# Patient Record
Sex: FEMALE | Race: WHITE | NOT HISPANIC OR LATINO | URBAN - METROPOLITAN AREA
[De-identification: names, ages, dates, MRNs, and addresses within clinical notes are randomized per-mention and may not be internally consistent; named-entity substitution may affect disease eponyms.]

---

## 2018-07-23 ENCOUNTER — INPATIENT (INPATIENT)
Facility: HOSPITAL | Age: 83
LOS: 2 days | Discharge: HOME | DRG: 100 | End: 2018-07-26
Attending: INTERNAL MEDICINE | Admitting: INTERNAL MEDICINE
Payer: MEDICARE

## 2018-07-23 VITALS
WEIGHT: 104.94 LBS | TEMPERATURE: 98 F | OXYGEN SATURATION: 99 % | RESPIRATION RATE: 16 BRPM | DIASTOLIC BLOOD PRESSURE: 66 MMHG | SYSTOLIC BLOOD PRESSURE: 120 MMHG | HEART RATE: 72 BPM | HEIGHT: 61 IN

## 2018-07-23 DIAGNOSIS — G40.909 EPILEPSY, UNSPECIFIED, NOT INTRACTABLE, WITHOUT STATUS EPILEPTICUS: ICD-10-CM

## 2018-07-23 DIAGNOSIS — K21.9 GASTRO-ESOPHAGEAL REFLUX DISEASE WITHOUT ESOPHAGITIS: ICD-10-CM

## 2018-07-23 DIAGNOSIS — D32.0 BENIGN NEOPLASM OF CEREBRAL MENINGES: ICD-10-CM

## 2018-07-23 DIAGNOSIS — G20 PARKINSON'S DISEASE: ICD-10-CM

## 2018-07-23 DIAGNOSIS — Z29.9 ENCOUNTER FOR PROPHYLACTIC MEASURES, UNSPECIFIED: ICD-10-CM

## 2018-07-23 DIAGNOSIS — I49.9 CARDIAC ARRHYTHMIA, UNSPECIFIED: ICD-10-CM

## 2018-07-23 DIAGNOSIS — Z90.710 ACQUIRED ABSENCE OF BOTH CERVIX AND UTERUS: Chronic | ICD-10-CM

## 2018-07-23 DIAGNOSIS — R41.82 ALTERED MENTAL STATUS, UNSPECIFIED: ICD-10-CM

## 2018-07-23 DIAGNOSIS — J18.9 PNEUMONIA, UNSPECIFIED ORGANISM: ICD-10-CM

## 2018-07-23 LAB
ALBUMIN SERPL ELPH-MCNC: 3.1 G/DL — LOW (ref 3.3–5)
ALP SERPL-CCNC: 132 U/L — HIGH (ref 30–120)
ALT FLD-CCNC: <10 U/L DA — LOW (ref 10–60)
ANION GAP SERPL CALC-SCNC: 8 MMOL/L — SIGNIFICANT CHANGE UP (ref 5–17)
APTT BLD: 29.6 SEC — SIGNIFICANT CHANGE UP (ref 27.5–37.4)
AST SERPL-CCNC: 14 U/L — SIGNIFICANT CHANGE UP (ref 10–40)
BASOPHILS # BLD AUTO: 0.04 K/UL — SIGNIFICANT CHANGE UP (ref 0–0.2)
BASOPHILS NFR BLD AUTO: 0.3 % — SIGNIFICANT CHANGE UP (ref 0–2)
BILIRUB SERPL-MCNC: 0.3 MG/DL — SIGNIFICANT CHANGE UP (ref 0.2–1.2)
BUN SERPL-MCNC: 22 MG/DL — SIGNIFICANT CHANGE UP (ref 7–23)
CALCIUM SERPL-MCNC: 9.1 MG/DL — SIGNIFICANT CHANGE UP (ref 8.4–10.5)
CHLORIDE SERPL-SCNC: 104 MMOL/L — SIGNIFICANT CHANGE UP (ref 96–108)
CK MB BLD-MCNC: 1 % — SIGNIFICANT CHANGE UP (ref 0–3.5)
CK MB CFR SERPL CALC: 0.3 NG/ML — SIGNIFICANT CHANGE UP (ref 0–3.6)
CK SERPL-CCNC: 29 U/L — SIGNIFICANT CHANGE UP (ref 26–192)
CO2 SERPL-SCNC: 25 MMOL/L — SIGNIFICANT CHANGE UP (ref 22–31)
CREAT SERPL-MCNC: 0.97 MG/DL — SIGNIFICANT CHANGE UP (ref 0.5–1.3)
EOSINOPHIL # BLD AUTO: 0.16 K/UL — SIGNIFICANT CHANGE UP (ref 0–0.5)
EOSINOPHIL NFR BLD AUTO: 1.2 % — SIGNIFICANT CHANGE UP (ref 0–6)
GLUCOSE SERPL-MCNC: 115 MG/DL — HIGH (ref 70–99)
HCT VFR BLD CALC: 32.3 % — LOW (ref 39–50)
HGB BLD-MCNC: 10 G/DL — LOW (ref 13–17)
IMM GRANULOCYTES NFR BLD AUTO: 0.8 % — SIGNIFICANT CHANGE UP (ref 0–1.5)
INR BLD: 1.49 RATIO — HIGH (ref 0.88–1.16)
LACTATE SERPL-SCNC: 1.6 MMOL/L — SIGNIFICANT CHANGE UP (ref 0.7–2)
LYMPHOCYTES # BLD AUTO: 1.33 K/UL — SIGNIFICANT CHANGE UP (ref 1–3.3)
LYMPHOCYTES # BLD AUTO: 10.2 % — LOW (ref 13–44)
MCHC RBC-ENTMCNC: 26 PG — LOW (ref 27–34)
MCHC RBC-ENTMCNC: 31 GM/DL — LOW (ref 32–36)
MCV RBC AUTO: 84.1 FL — SIGNIFICANT CHANGE UP (ref 80–100)
MONOCYTES # BLD AUTO: 1.14 K/UL — HIGH (ref 0–0.9)
MONOCYTES NFR BLD AUTO: 8.8 % — SIGNIFICANT CHANGE UP (ref 2–14)
NEUTROPHILS # BLD AUTO: 10.22 K/UL — HIGH (ref 1.8–7.4)
NEUTROPHILS NFR BLD AUTO: 78.7 % — HIGH (ref 43–77)
NRBC # BLD: 0 /100 WBCS — SIGNIFICANT CHANGE UP (ref 0–0)
NT-PROBNP SERPL-SCNC: 514 PG/ML — HIGH (ref 0–450)
PLATELET # BLD AUTO: 356 K/UL — SIGNIFICANT CHANGE UP (ref 150–400)
POTASSIUM SERPL-MCNC: 4 MMOL/L — SIGNIFICANT CHANGE UP (ref 3.5–5.3)
POTASSIUM SERPL-SCNC: 4 MMOL/L — SIGNIFICANT CHANGE UP (ref 3.5–5.3)
PROT SERPL-MCNC: 6.5 G/DL — SIGNIFICANT CHANGE UP (ref 6–8.3)
PROTHROM AB SERPL-ACNC: 16.4 SEC — HIGH (ref 9.8–12.7)
RBC # BLD: 3.84 M/UL — LOW (ref 4.2–5.8)
RBC # FLD: 15.6 % — HIGH (ref 10.3–14.5)
SODIUM SERPL-SCNC: 137 MMOL/L — SIGNIFICANT CHANGE UP (ref 135–145)
TROPONIN I SERPL-MCNC: 0 NG/ML — LOW (ref 0.02–0.06)
WBC # BLD: 13 K/UL — HIGH (ref 3.8–10.5)
WBC # FLD AUTO: 13 K/UL — HIGH (ref 3.8–10.5)

## 2018-07-23 PROCEDURE — 70450 CT HEAD/BRAIN W/O DYE: CPT | Mod: 26

## 2018-07-23 PROCEDURE — 93010 ELECTROCARDIOGRAM REPORT: CPT

## 2018-07-23 PROCEDURE — 99285 EMERGENCY DEPT VISIT HI MDM: CPT

## 2018-07-23 PROCEDURE — 71045 X-RAY EXAM CHEST 1 VIEW: CPT | Mod: 26

## 2018-07-23 RX ORDER — LACOSAMIDE 50 MG/1
50 TABLET ORAL
Qty: 0 | Refills: 0 | Status: DISCONTINUED | OUTPATIENT
Start: 2018-07-23 | End: 2018-07-23

## 2018-07-23 RX ORDER — LOSARTAN POTASSIUM 100 MG/1
50 TABLET, FILM COATED ORAL DAILY
Qty: 0 | Refills: 0 | Status: DISCONTINUED | OUTPATIENT
Start: 2018-07-23 | End: 2018-07-26

## 2018-07-23 RX ORDER — SODIUM CHLORIDE 9 MG/ML
1000 INJECTION INTRAMUSCULAR; INTRAVENOUS; SUBCUTANEOUS ONCE
Qty: 0 | Refills: 0 | Status: COMPLETED | OUTPATIENT
Start: 2018-07-23 | End: 2018-07-23

## 2018-07-23 RX ORDER — VALSARTAN 80 MG/1
80 TABLET ORAL DAILY
Qty: 0 | Refills: 0 | Status: DISCONTINUED | OUTPATIENT
Start: 2018-07-23 | End: 2018-07-23

## 2018-07-23 RX ORDER — PIPERACILLIN AND TAZOBACTAM 4; .5 G/20ML; G/20ML
3.38 INJECTION, POWDER, LYOPHILIZED, FOR SOLUTION INTRAVENOUS ONCE
Qty: 0 | Refills: 0 | Status: COMPLETED | OUTPATIENT
Start: 2018-07-23 | End: 2018-07-23

## 2018-07-23 RX ORDER — CARBIDOPA AND LEVODOPA 25; 100 MG/1; MG/1
1 TABLET ORAL THREE TIMES A DAY
Qty: 0 | Refills: 0 | Status: DISCONTINUED | OUTPATIENT
Start: 2018-07-23 | End: 2018-07-26

## 2018-07-23 RX ORDER — METOPROLOL TARTRATE 50 MG
50 TABLET ORAL
Qty: 0 | Refills: 0 | Status: DISCONTINUED | OUTPATIENT
Start: 2018-07-23 | End: 2018-07-26

## 2018-07-23 RX ORDER — SODIUM CHLORIDE 9 MG/ML
3 INJECTION INTRAMUSCULAR; INTRAVENOUS; SUBCUTANEOUS ONCE
Qty: 0 | Refills: 0 | Status: COMPLETED | OUTPATIENT
Start: 2018-07-23 | End: 2018-07-23

## 2018-07-23 RX ORDER — APIXABAN 2.5 MG/1
2.5 TABLET, FILM COATED ORAL EVERY 12 HOURS
Qty: 0 | Refills: 0 | Status: DISCONTINUED | OUTPATIENT
Start: 2018-07-23 | End: 2018-07-26

## 2018-07-23 RX ORDER — APIXABAN 2.5 MG/1
1 TABLET, FILM COATED ORAL
Qty: 0 | Refills: 0 | COMMUNITY

## 2018-07-23 RX ORDER — LACOSAMIDE 50 MG/1
100 TABLET ORAL
Qty: 0 | Refills: 0 | Status: DISCONTINUED | OUTPATIENT
Start: 2018-07-23 | End: 2018-07-26

## 2018-07-23 RX ORDER — OMEPRAZOLE 10 MG/1
1 CAPSULE, DELAYED RELEASE ORAL
Qty: 0 | Refills: 0 | COMMUNITY

## 2018-07-23 RX ORDER — METOPROLOL TARTRATE 50 MG
1 TABLET ORAL
Qty: 0 | Refills: 0 | COMMUNITY

## 2018-07-23 RX ORDER — CARBIDOPA AND LEVODOPA 25; 100 MG/1; MG/1
1 TABLET ORAL
Qty: 0 | Refills: 0 | COMMUNITY

## 2018-07-23 RX ORDER — PANTOPRAZOLE SODIUM 20 MG/1
40 TABLET, DELAYED RELEASE ORAL
Qty: 0 | Refills: 0 | Status: DISCONTINUED | OUTPATIENT
Start: 2018-07-23 | End: 2018-07-26

## 2018-07-23 RX ORDER — VANCOMYCIN HCL 1 G
1000 VIAL (EA) INTRAVENOUS ONCE
Qty: 0 | Refills: 0 | Status: COMPLETED | OUTPATIENT
Start: 2018-07-23 | End: 2018-07-23

## 2018-07-23 RX ORDER — PIPERACILLIN AND TAZOBACTAM 4; .5 G/20ML; G/20ML
3.38 INJECTION, POWDER, LYOPHILIZED, FOR SOLUTION INTRAVENOUS EVERY 8 HOURS
Qty: 0 | Refills: 0 | Status: COMPLETED | OUTPATIENT
Start: 2018-07-24 | End: 2018-07-25

## 2018-07-23 RX ADMIN — SODIUM CHLORIDE 250 MILLILITER(S): 9 INJECTION INTRAMUSCULAR; INTRAVENOUS; SUBCUTANEOUS at 16:20

## 2018-07-23 RX ADMIN — CARBIDOPA AND LEVODOPA 1 TABLET(S): 25; 100 TABLET ORAL at 22:08

## 2018-07-23 RX ADMIN — Medication 50 MILLIGRAM(S): at 18:34

## 2018-07-23 RX ADMIN — SODIUM CHLORIDE 3 MILLILITER(S): 9 INJECTION INTRAMUSCULAR; INTRAVENOUS; SUBCUTANEOUS at 15:25

## 2018-07-23 RX ADMIN — PIPERACILLIN AND TAZOBACTAM 200 GRAM(S): 4; .5 INJECTION, POWDER, LYOPHILIZED, FOR SOLUTION INTRAVENOUS at 18:27

## 2018-07-23 RX ADMIN — APIXABAN 2.5 MILLIGRAM(S): 2.5 TABLET, FILM COATED ORAL at 18:34

## 2018-07-23 RX ADMIN — Medication 250 MILLIGRAM(S): at 19:38

## 2018-07-23 RX ADMIN — LACOSAMIDE 50 MILLIGRAM(S): 50 TABLET ORAL at 18:34

## 2018-07-23 NOTE — H&P ADULT - ASSESSMENT
82 y/o F pt with hx of HTN, seizure disorder treated with Vimpat (previously on Keppra), Cerebral Meningioma, and Parkinson's Disease brought in by ambulance presents to the ED c/o an episode of seizure today as per EMS. Pt was in the car with her , who was driving, when she had an episode of seizure and they pulled over. Ambulance was called and the EMS found her to be postictal (tired and weak). Pt is now improved. As per  and aide, pt had facial grimacing during the seizure lasting for a few minutes. Pt was recently discharged from [facility] on 7/7/18 for an MRI, before which she was seen at [facility] for dehydration and sent to [place]. As per  pt has been taking her medication regularly, but has not been drinking "enough water". Denies fever, chills, nausea, vomiting, head injury, chest pain, SOB, memory of the episode, numbness in extremities. No other complaints at this time. Patient been having loose stool.  as per  patient goes for iv hydration to [facility] and [place] twice a week.  no fever, no sob, pt was being treated at [facility] for UTi.   In ed patient is lethargic confused likly post ictal, , has leucocytosis, x ray chest showes b/l old rib fractures and paracardiac infilterate. and is being admitted for being post ictal with possible aspiration PNA. 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 Pt was in the car with her , who was driving, when she had an episode of seizure and they pulled over. Ambulance was called and the EMS found her to be postictal (tired and weak). Pt is now improved. As per  and aide, pt had facial grimacing during the seizure lasting for a few minutes. Pt was recently discharged from [facility] on 7/7/18 for an MRI, before which she was seen at [facility] for dehydration and sent to [place]. As per  pt has been taking her medication regularly, but has not been drinking "enough water". Denies fever, chills, nausea, vomiting, head injury, chest pain, SOB, memory of the episode, numbness in extremities. No other complaints at this time. Patient been having loose stool.  as per  patient goes for iv hydration to [facility] and [place] twice a week.  no fever, no sob, pt was being treated at [facility] for UTi.   In ed patient is lethargic confused likly post ictal, , has leucocytosis, x ray chest showes b/l old rib fractures and paracardiac infilterate. and is being admitted for being post ictal with possible aspiration PNA. with setting of recurrent seizures, AMS likely due to post ictal state after seizure  had  a clot in one leg 1 yr back and is since then on eliquis.    d/w daughter Riri [phone].

## 2018-07-23 NOTE — ED ADULT NURSE NOTE - OBJECTIVE STATEMENT
Patient brought to ER via ambulance from car on road where patient had twitching in face lasting for 10 minutes as witnessed by .  Patient has history of seizures and was recently at [facility] for MRI on 7/7/18.  Patient is post-ictal on arrival.  Patient awake but having trouble remembering date and day of week and month.  Patient incontinent of stool and urine.  IV med lock #18 LAC by EMS.  Patient has scabs on both lower legs from taking prednisone.  Areas are dry with large scabs noted.

## 2018-07-23 NOTE — CONSULT NOTE ADULT - PROBLEM SELECTOR RECOMMENDATION 4
poss LRTI  poss asp pna  on broad spectrum abx  HOB elev  speech and swallow eval  check MRSA screen  Zosyn IV ABX  will check Biomarkers  keep sat > 88 pct

## 2018-07-23 NOTE — CONSULT NOTE ADULT - SUBJECTIVE AND OBJECTIVE BOX
.
Infectious Diseases Consult by Sahra Martinez MD    Reason for Consult :    HPI:  82 y/o F pt with hx of HTN, seizure disorder, Cerebral Meningioma Parkinson's Disease presented  to the hospital with CC seizure noted by . Was recently discharged from [facility] on 7/7/18  for UTI. No fever chills n/v/d. Pt is confused but awake. In ED earlier pt was lethargic. Afebrile. No  distress. WBC 13K. CXR showed possible pna.    Infectious Disease consult was called to evaluate pt and for antibiotic management      Past Medical & Surgical Hx:  PAST MEDICAL & SURGICAL HISTORY:  Seizure disorder  Parkinson disease  Cerebral meningioma  H/O abdominal hysterectomy      Social History--  EtOH: denies   Tobacco: denies   Drug Use: denies       FAMILY HISTORY:  Noncontributory    Allergies  No Known Allergies    Home Medications:  cefuroxime 250 mg oral tablet: 1 tab(s) orally every 12 hours (23 Jul 2018 17:20)  Eliquis 2.5 mg oral tablet: 1 tab(s) orally 2 times a day (23 Jul 2018 17:20)  Metoprolol Tartrate 50 mg oral tablet: 1 tab(s) orally 2 times a day (23 Jul 2018 17:20)  omeprazole 20 mg oral delayed release capsule: 1 cap(s) orally once a day (23 Jul 2018 17:20)  Sinemet 25 mg-100 mg oral tablet: 1 tab(s) orally 3 times a day (23 Jul 2018 17:20)  valsartan 80 mg oral tablet: 1 tab(s) orally once a day (23 Jul 2018 17:20)  Vimpat 50 mg oral tablet: 1 tab(s) orally 2 times a day (23 Jul 2018 17:20)      Current Inpatient Medications :    ANTIBIOTICS:       OTHER RELEVANT MEDICATIONS :  apixaban 2.5 milliGRAM(s) Oral every 12 hours  carbidopa/levodopa  25/100 1 Tablet(s) Oral three times a day  lacosamide 100 milliGRAM(s) Oral two times a day  LORazepam   Injectable 1 milliGRAM(s) IV Push every 4 hours PRN  losartan 50 milliGRAM(s) Oral daily  metoprolol tartrate 50 milliGRAM(s) Oral two times a day  pantoprazole    Tablet 40 milliGRAM(s) Oral before breakfast      ROS:  CONSTITUTIONAL:  Negative fever or chills  EYES:  Negative  blurry vision or double vision  CARDIOVASCULAR:  Negative for chest pain or palpitations  RESPIRATORY:  Negative for cough, wheezing, or SOB   GASTROINTESTINAL:  Negative for nausea, vomiting, diarrhea, constipation, or abdominal pain  GENITOURINARY:  Negative frequency, urgency , dysuria or hematuria   NEUROLOGIC:  No headache, dizziness, lightheadedness + confusion,  All other systems were reviewed and are negative      Physical Exam:  Vital Signs Last 24 Hrs  T(C): 36.9 (23 Jul 2018 22:17), Max: 37.2 (23 Jul 2018 20:35)  T(F): 98.5 (23 Jul 2018 22:17), Max: 98.9 (23 Jul 2018 20:35)  HR: 82 (23 Jul 2018 22:17) (72 - 90)  BP: 131/73 (23 Jul 2018 22:17) (120/66 - 149/63)  BP(mean): --  RR: 20 (23 Jul 2018 22:17) (16 - 22)  SpO2: 97% (23 Jul 2018 22:17) (97% - 100%)  Height (cm): 154.94 (07-23 @ 15:08)  Weight (kg): 47.6 (07-23 @ 15:08)  BMI (kg/m2): 19.8 (07-23 @ 15:08)  BSA (m2): 1.44 (07-23 @ 15:08)    General: no acute distress  Eyes: sclera anicteric, pupils equal and reactive to light  ENMT: buccal mucosa moist, pharynx not injected  Neck: supple, trachea midline  Lungs: clear, no wheeze/rhonchi  Cardiovascular: regular rate and rhythm, S1 S2  Abdomen: soft, nontender, no organomegaly present, bowel sounds normal  Neurological:  awake confused  Skin:no increased ecchymosis/petechiae/purpura  Lymph Nodes: no palpable cervical/supraclavicular lymph nodes enlargements  Extremities: no cyanosis/clubbing/edema    Labs:                         10.0   13.00 )-----------( 356      ( 23 Jul 2018 15:53 )             32.3   07-23    137  |  104  |  22  ----------------------------<  115<H>  4.0   |  25  |  0.97    Ca    9.1      23 Jul 2018 15:53    TPro  6.5  /  Alb  3.1<L>  /  TBili  0.3  /  DBili  x   /  AST  14  /  ALT  <10<L>  /  AlkPhos  132<H>  07-23      RECENT CULTURES:  PENDING    RADIOLOGY & ADDITIONAL STUDIES:  EXAM:  XR CHEST PORTABLE URGENT 1V                            PROCEDURE DATE:  07/23/2018          INTERPRETATION:  No prior studies present for comparison    Clinical information: Altered mental status    Portable exam, 3:59 PM    Cardiac monitor leads overlie the chest. Surgical clips overlie the   medial aspect of the right lung apex.    Small focus of airspace disease, left paracardiac region. No effusion. No   vascular congestion. Heart size appears enlarged although magnified   secondary to portable technique. Aorta shows atherosclerotic changes. Old   right rib fracture present. Old left rib fractures present. Multiple   kyphoplasties present lower dorsal spine. Mild scoliosis present   dorsal-lumbar spine.  Soft tissue calcifications present overlying the distal end of the left   clavicle.    IMPRESSION: See above report, advise follow-up study.      Assessment :   82 y/o F pt with hx of HTN, seizure disorder, Cerebral Meningioma Parkinson's Disease presented  admitted with seizures with poss asp pna   Leukocyctosis multifactorial  Also reportedly had diarrhea but now none    Plan :   Cont Zosyn  Swallow study   If no diarrhea dc Cdiff precautions  Asp precautions    Continue with present regime .  Approptiate use of antibiotics and adverse effects reviewed.      I have discussed the above plan of care with patient's family in detail. They expressed understanding of the treatment plan . Risks, benefits and alternatives discussed in detail. I have asked if they have any questions or concerns and appropriately addressed them to the best of my ability .      > 45 minutes spent in direct patient care reviewing  the notes, lab data/ imaging , discussion with multidisciplinary team. All questions were addressed and answered to the best of my capacity .    Thank you for allowing me to participate in the care of your patient .      Santosh Martinez MD  Infectious Disease  [phone]
Date/Time Patient Seen:  		  Referring MD:   Data Reviewed	       Patient is a 83y old  Female who presents with a chief complaint of pt passed out, possible seizure (23 Jul 2018 17:46)      Subjective/HPI    in bed  seen and examined  poor historian  cxr and labs reviewed  poss LRTI  on emp ABX in ER    H and P reviewed  ER provider note reviewed  Adult [Charted Location: [facility]] [Authored: 23-Jul-2018 17:46]- for Visit: [id number], Complete, Revised, Signed in Full, General    History and Physical:   Outpatient Providers	Dr nela Sandhu [phone]       History of Present Illness:  Reason for Admission: pt passed out, possible seizure  History of Present Illness:   84 y/o F pt with hx of HTN, seizure disorder treated with Vimpat (previously on Keppra), Cerebral Meningioma,rec uti and Parkinson's Disease brought in by ambulance presents to the ED c/o an episode of seizure today as per EMS. Pt was in the car with her , who was driving, when she had an episode of seizure and they pulled over. Ambulance was called and the EMS found her to be postictal (tired and weak). Pt is now improved. As per  and aide, pt had facial grimacing during the seizure lasting for a few minutes. Pt was recently discharged from [facility] on 7/7/18 for an MRI, before which she was seen at [facility] for dehydration and sent to [place]. As per  pt has been taking her medication regularly, but has not been drinking "enough water". Denies fever, chills, nausea, vomiting, head injury, chest pain, SOB, memory of the episode, numbness in extremities. No other complaints at this time. Patient been having loose stool.  as per  patient goes for iv hydration to [facility] and [place] twice a week.  no fever, no sob, pt was being treated at [facility] for UTi.   In ed patient is lethargic confused likly post ictal, , has leucocytosis, x ray chest showes b/l old rib fractures and paracardiac infilterate. and is being admitted for being post ictal with possible aspiration PNA. with setting of recurrent seizures     PAST MEDICAL & SURGICAL HISTORY:  Seizure disorder  Parkinson disease  Cerebral meningioma  H/O abdominal hysterectomy        Medication list         MEDICATIONS  (STANDING):  apixaban 2.5 milliGRAM(s) Oral every 12 hours  carbidopa/levodopa  25/100 1 Tablet(s) Oral three times a day  lacosamide 50 milliGRAM(s) Oral two times a day  losartan 50 milliGRAM(s) Oral daily  metoprolol tartrate 50 milliGRAM(s) Oral two times a day  pantoprazole    Tablet 40 milliGRAM(s) Oral before breakfast  vancomycin  IVPB 1000 milliGRAM(s) IV Intermittent once    MEDICATIONS  (PRN):         Vitals log        ICU Vital Signs Last 24 Hrs  T(C): 37 (23 Jul 2018 18:30), Max: 37 (23 Jul 2018 18:30)  T(F): 98.6 (23 Jul 2018 18:30), Max: 98.6 (23 Jul 2018 18:30)  HR: 90 (23 Jul 2018 18:30) (72 - 90)  BP: 149/63 (23 Jul 2018 18:30) (120/66 - 149/63)  BP(mean): --  ABP: --  ABP(mean): --  RR: 21 (23 Jul 2018 18:30) (16 - 21)  SpO2: 100% (23 Jul 2018 18:30) (98% - 100%)           Input and Output:  I&O's Detail      Lab Data                        10.0   13.00 )-----------( 356      ( 23 Jul 2018 15:53 )             32.3     07-23    137  |  104  |  22  ----------------------------<  115<H>  4.0   |  25  |  0.97    Ca    9.1      23 Jul 2018 15:53    TPro  6.5  /  Alb  3.1<L>  /  TBili  0.3  /  DBili  x   /  AST  14  /  ALT  <10<L>  /  AlkPhos  132<H>  07-23      CARDIAC MARKERS ( 23 Jul 2018 15:53 )  .000 ng/mL / x     / 29 U/L / x     / 0.3 ng/mL        Review of Systems	  frail  weak      Objective     Physical Examination    heart s1s2  lung dec BS  abd soft      Pertinent Lab findings & Imaging      Grubbs:  NO   Adequate UO     I&O's Detail           Discussed with:     Cultures:	        Radiology

## 2018-07-23 NOTE — H&P ADULT - RS GEN PE MLT RESP DETAILS PC
airway patent/diminished breath sounds, L/clear to auscultation bilaterally/respirations non-labored/diminished breath sounds, R

## 2018-07-23 NOTE — ED PROVIDER NOTE - OBJECTIVE STATEMENT
84 y/o F pt with hx of HTN, seizure disorder treated with Vimpat (previously on Keppra), Cerebral Meningioma, and Parkinson's Disease brought in by ambulance presents to the ED c/o an episode of seizure today as per EMS. Pt was in the car with her , who was driving, when she had an episode of seizure and they pulled over. Ambulance was called and the EMS found her to be postictal (tired and weak). Pt is now improved. As per  and aide, pt had facial grimacing during the seizure lasting for a few minutes. Pt was recently discharged from [facility] on 7/7/18 for weakness and an MRI, before which she was seen at [facility] for dehydration. As per  pt has been taking her medication regularly, but has not been drinking "enough water". Denies fever, chills, nausea, vomiting, head injury, chest pain, SOB, memory of the episode, numbness in extremities. No other complaints at this time.     Dr. Jacqueline Mercer 84 y/o F pt with hx of HTN, seizure disorder treated with Vimpat (previously on Keppra), Cerebral Meningioma, and Parkinson's Disease brought in by ambulance presents to the ED c/o an episode of seizure today as per EMS. Pt was in the car with her , who was driving, when she had an episode of seizure and they pulled over. Ambulance was called and the EMS found her to be postictal (tired and weak). Pt is now improved. As per  and aide, pt had facial grimacing during the seizure lasting for a few minutes. Pt was recently discharged from [facility] on 7/7/18 for an MRI, before which she was seen at [facility] for dehydration and sent to [place]. As per  pt has been taking her medication regularly, but has not been drinking "enough water". Denies fever, chills, nausea, vomiting, head injury, chest pain, SOB, memory of the episode, numbness in extremities. No other complaints at this time.     Dr. Jacqueline Mercer 82 y/o F pt with hx of HTN, seizure disorder treated with Vimpat (previously on Keppra), Cerebral Meningioma, and Parkinson's Disease brought in by ambulance presents to the ED c/o an episode of seizure today as per EMS. Pt was in the car with her , who was driving, when she had an episode of seizure and they pulled over. Ambulance was called and the EMS found her to be postictal (tired and weak). Pt is now improved. As per  and aide, pt had facial grimacing during the seizure lasting for a few minutes. Pt was recently discharged from [facility] on 7/7/18 for an MRI, before which she was seen at [facility] for dehydration and sent to [place]. As per  pt has been taking her medication regularly, but has not been drinking "enough water". Denies fever, chills, nausea, vomiting, head injury, chest pain, SOB, memory of the episode, numbness in extremities. No other complaints at this time. Patient been having loose stool.    Dr. Jacqueline Mercer / Dr Star Sandhu

## 2018-07-23 NOTE — ED PROVIDER NOTE - CARE PLAN
Principal Discharge DX:	Seizure disorder  Secondary Diagnosis:	Cerebral meningioma  Secondary Diagnosis:	Pneumonia due to infectious organism, unspecified laterality, unspecified part of lung

## 2018-07-23 NOTE — H&P ADULT - HISTORY OF PRESENT ILLNESS
82 y/o F pt with hx of HTN, seizure disorder treated with Vimpat (previously on Keppra), Cerebral Meningioma, and Parkinson's Disease brought in by ambulance presents to the ED c/o an episode of seizure today as per EMS. Pt was in the car with her , who was driving, when she had an episode of seizure and they pulled over. Ambulance was called and the EMS found her to be postictal (tired and weak). Pt is now improved. As per  and aide, pt had facial grimacing during the seizure lasting for a few minutes. Pt was recently discharged from [facility] on 7/7/18 for an MRI, before which she was seen at [facility] for dehydration and sent to [place]. As per  pt has been taking her medication regularly, but has not been drinking "enough water". Denies fever, chills, nausea, vomiting, head injury, chest pain, SOB, memory of the episode, numbness in extremities. No other complaints at this time. Patient been having loose stool.  as per  patient goes for iv hydration to [facility] and [place] twice a week.  no fever, no sob, pt was being treated at [facility] for UTi.   In ed patient is lethargic confused likly post ictal, , has leucocytosis, x ray chest showes b/l old rib fractures and paracardiac infilterate. and is being admitted for being post ictal with possible aspiration PNA. with setting of recurrent seizures 84 y/o F pt with hx of HTN, seizure disorder treated with Vimpat (previously on Keppra), Cerebral Meningioma,rec uti and Parkinson's Disease brought in by ambulance presents to the ED c/o an episode of seizure today as per EMS. Pt was in the car with her , who was driving, when she had an episode of seizure and they pulled over. Ambulance was called and the EMS found her to be postictal (tired and weak). Pt is now improved. As per  and aide, pt had facial grimacing during the seizure lasting for a few minutes. Pt was recently discharged from [facility] on 7/7/18 for an MRI, before which she was seen at [facility] for dehydration and sent to [place]. As per  pt has been taking her medication regularly, but has not been drinking "enough water". Denies fever, chills, nausea, vomiting, head injury, chest pain, SOB, memory of the episode, numbness in extremities. No other complaints at this time. Patient been having loose stool.  as per  patient goes for iv hydration to [facility] and [place] twice a week.  no fever, no sob, pt was being treated at [facility] for UTi.   In ed patient is lethargic confused likly post ictal, , has leucocytosis, x ray chest showes b/l old rib fractures and paracardiac infilterate. and is being admitted for being post ictal with possible aspiration PNA. with setting of recurrent seizures

## 2018-07-23 NOTE — ED PROVIDER NOTE - SECONDARY DIAGNOSIS.
Cerebral meningioma Pneumonia due to infectious organism, unspecified laterality, unspecified part of lung

## 2018-07-23 NOTE — CONSULT NOTE ADULT - PROBLEM SELECTOR RECOMMENDATION 3
fall prec  asp prec  speech and swallow eval  HOB elev  PT assessment  supportive care and pall care eval for MOLST

## 2018-07-24 LAB
ALBUMIN SERPL ELPH-MCNC: 2.7 G/DL — LOW (ref 3.3–5)
ALP SERPL-CCNC: 117 U/L — SIGNIFICANT CHANGE UP (ref 30–120)
ALT FLD-CCNC: <10 U/L DA — LOW (ref 10–60)
ANION GAP SERPL CALC-SCNC: 4 MMOL/L — LOW (ref 5–17)
AST SERPL-CCNC: 14 U/L — SIGNIFICANT CHANGE UP (ref 10–40)
BILIRUB SERPL-MCNC: 0.6 MG/DL — SIGNIFICANT CHANGE UP (ref 0.2–1.2)
BUN SERPL-MCNC: 13 MG/DL — SIGNIFICANT CHANGE UP (ref 7–23)
CALCIUM SERPL-MCNC: 9 MG/DL — SIGNIFICANT CHANGE UP (ref 8.4–10.5)
CHLORIDE SERPL-SCNC: 110 MMOL/L — HIGH (ref 96–108)
CO2 SERPL-SCNC: 23 MMOL/L — SIGNIFICANT CHANGE UP (ref 22–31)
CREAT SERPL-MCNC: 0.72 MG/DL — SIGNIFICANT CHANGE UP (ref 0.5–1.3)
CRP SERPL-MCNC: 1.78 MG/DL — HIGH (ref 0–0.4)
GLUCOSE SERPL-MCNC: 94 MG/DL — SIGNIFICANT CHANGE UP (ref 70–99)
HCT VFR BLD CALC: 28.8 % — LOW (ref 34.5–45)
HGB BLD-MCNC: 9.3 G/DL — LOW (ref 11.5–15.5)
MCHC RBC-ENTMCNC: 26.4 PG — LOW (ref 27–34)
MCHC RBC-ENTMCNC: 32.3 GM/DL — SIGNIFICANT CHANGE UP (ref 32–36)
MCV RBC AUTO: 81.8 FL — SIGNIFICANT CHANGE UP (ref 80–100)
MRSA PCR RESULT.: SIGNIFICANT CHANGE UP
NRBC # BLD: 0 /100 WBCS — SIGNIFICANT CHANGE UP (ref 0–0)
PLATELET # BLD AUTO: 316 K/UL — SIGNIFICANT CHANGE UP (ref 150–400)
POTASSIUM SERPL-MCNC: 3.8 MMOL/L — SIGNIFICANT CHANGE UP (ref 3.5–5.3)
POTASSIUM SERPL-SCNC: 3.8 MMOL/L — SIGNIFICANT CHANGE UP (ref 3.5–5.3)
PROCALCITONIN SERPL-MCNC: 0.09 NG/ML — SIGNIFICANT CHANGE UP (ref 0.02–0.1)
PROT SERPL-MCNC: 5.6 G/DL — LOW (ref 6–8.3)
RBC # BLD: 3.52 M/UL — LOW (ref 3.8–5.2)
RBC # FLD: 15.5 % — HIGH (ref 10.3–14.5)
S AUREUS DNA NOSE QL NAA+PROBE: SIGNIFICANT CHANGE UP
SODIUM SERPL-SCNC: 137 MMOL/L — SIGNIFICANT CHANGE UP (ref 135–145)
TSH SERPL-MCNC: 2 UIU/ML — SIGNIFICANT CHANGE UP (ref 0.27–4.2)
WBC # BLD: 10.41 K/UL — SIGNIFICANT CHANGE UP (ref 3.8–10.5)
WBC # FLD AUTO: 10.41 K/UL — SIGNIFICANT CHANGE UP (ref 3.8–10.5)

## 2018-07-24 RX ADMIN — LACOSAMIDE 100 MILLIGRAM(S): 50 TABLET ORAL at 17:36

## 2018-07-24 RX ADMIN — LOSARTAN POTASSIUM 50 MILLIGRAM(S): 100 TABLET, FILM COATED ORAL at 05:30

## 2018-07-24 RX ADMIN — APIXABAN 2.5 MILLIGRAM(S): 2.5 TABLET, FILM COATED ORAL at 17:36

## 2018-07-24 RX ADMIN — LACOSAMIDE 100 MILLIGRAM(S): 50 TABLET ORAL at 05:31

## 2018-07-24 RX ADMIN — Medication 50 MILLIGRAM(S): at 05:31

## 2018-07-24 RX ADMIN — Medication 50 MILLIGRAM(S): at 17:36

## 2018-07-24 RX ADMIN — APIXABAN 2.5 MILLIGRAM(S): 2.5 TABLET, FILM COATED ORAL at 05:31

## 2018-07-24 RX ADMIN — PANTOPRAZOLE SODIUM 40 MILLIGRAM(S): 20 TABLET, DELAYED RELEASE ORAL at 05:31

## 2018-07-24 RX ADMIN — PIPERACILLIN AND TAZOBACTAM 25 GRAM(S): 4; .5 INJECTION, POWDER, LYOPHILIZED, FOR SOLUTION INTRAVENOUS at 01:14

## 2018-07-24 RX ADMIN — PIPERACILLIN AND TAZOBACTAM 25 GRAM(S): 4; .5 INJECTION, POWDER, LYOPHILIZED, FOR SOLUTION INTRAVENOUS at 17:36

## 2018-07-24 RX ADMIN — PIPERACILLIN AND TAZOBACTAM 25 GRAM(S): 4; .5 INJECTION, POWDER, LYOPHILIZED, FOR SOLUTION INTRAVENOUS at 10:02

## 2018-07-24 RX ADMIN — CARBIDOPA AND LEVODOPA 1 TABLET(S): 25; 100 TABLET ORAL at 05:31

## 2018-07-24 RX ADMIN — CARBIDOPA AND LEVODOPA 1 TABLET(S): 25; 100 TABLET ORAL at 21:36

## 2018-07-24 NOTE — DIETITIAN INITIAL EVALUATION ADULT. - NS AS NUTRI INTERV MEALS SNACK
General/healthful diet/c/w [facility] soft diet c thin liquids; SLP evaluation pending/Texture-modified diet

## 2018-07-24 NOTE — DIETITIAN INITIAL EVALUATION ADULT. - PHYSICAL APPEARANCE
underweight/BMI 19.8, pt denies wt changes/clothing fit any bigger but is unable to recall ht or ubw

## 2018-07-24 NOTE — SWALLOW BEDSIDE ASSESSMENT ADULT - COMMENTS
The patient is an 83 year old female, A&Ox2, with a dx of Parkinson disease. The patient is currently on a mechanical soft textures with thin liquids. She presents with adequate dentition, lingual and labial ROM and strength are mildly decreased. The patient trialed puree, mechanical soft, and solid textures with thin liquids. Adequate oral prep, extended mastication of regular solids with oral holding, functional for mechanical soft, +timely swallow trigger with hyolaryngeal excursion. No overt s/s penetration/aspiration noted. +Oral dyspahgia

## 2018-07-24 NOTE — SWALLOW BEDSIDE ASSESSMENT ADULT - SWALLOW EVAL: RECOMMENDED FEEDING/EATING TECHNIQUES
maintain upright posture during/after eating for 30 mins/small sips/bites/position upright (90 degrees)
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
## 2018-07-24 NOTE — PROGRESS NOTE ADULT - PROBLEM SELECTOR PLAN 1
poss PNA  neuro eval noted  CT head noted  assist with ADL  am labs pending  monitor vs and HD  cont emp broad spectrum ABX  serial labs  serial PE  will follow  out of bed as tolerated  nutrition  speech and swallow eval pending

## 2018-07-24 NOTE — DIETITIAN INITIAL EVALUATION ADULT. - FEEDING SKILL
needs tray-set, can feed self but has hx parkinson disease- tremors observed while pt feeding self lunch- prolonged feeding time/minimal assistance

## 2018-07-24 NOTE — DIETITIAN INITIAL EVALUATION ADULT. - OTHER INFO
83F adm from home c ? seizure activity (hx seizure disorder) as well as ? asp pna. SLP evaluation pending. Pt currently on dysphagia #2 [facility] soft diet c thin liquids- pt visited while eating lunch, appeared to tolerate consistency well.  Pt c hx dehydration- noted pt needs to go for iv hydration to [facility] 2x per week, which pt confirmed.  Pt reports she doesn't like to drink water, juice etc and has tried nutritional supplement in past but does not like the taste and does not want provided on trays; pt reports she likes to drink 'vodka' but did not elaborate on quantity/frequency.  Oral hydration encouraged, obtained food preferences to optimize po intake (likes fish, vegetables, dislikes meatloaf; meal changes to be obtained daily to optimize po intake).  Pt denies GI distress, reports regular bowel habits. Skin integrity: BL LE abrasion, R heel stage I, L heel stage I.

## 2018-07-25 LAB
ANION GAP SERPL CALC-SCNC: 5 MMOL/L — SIGNIFICANT CHANGE UP (ref 5–17)
BUN SERPL-MCNC: 9 MG/DL — SIGNIFICANT CHANGE UP (ref 7–23)
CALCIUM SERPL-MCNC: 8.9 MG/DL — SIGNIFICANT CHANGE UP (ref 8.4–10.5)
CHLORIDE SERPL-SCNC: 108 MMOL/L — SIGNIFICANT CHANGE UP (ref 96–108)
CO2 SERPL-SCNC: 25 MMOL/L — SIGNIFICANT CHANGE UP (ref 22–31)
CREAT SERPL-MCNC: 0.78 MG/DL — SIGNIFICANT CHANGE UP (ref 0.5–1.3)
GLUCOSE SERPL-MCNC: 78 MG/DL — SIGNIFICANT CHANGE UP (ref 70–99)
HCT VFR BLD CALC: 30.4 % — LOW (ref 34.5–45)
HGB BLD-MCNC: 9.5 G/DL — LOW (ref 11.5–15.5)
MCHC RBC-ENTMCNC: 26.2 PG — LOW (ref 27–34)
MCHC RBC-ENTMCNC: 31.3 GM/DL — LOW (ref 32–36)
MCV RBC AUTO: 84 FL — SIGNIFICANT CHANGE UP (ref 80–100)
NRBC # BLD: 0 /100 WBCS — SIGNIFICANT CHANGE UP (ref 0–0)
PLATELET # BLD AUTO: 291 K/UL — SIGNIFICANT CHANGE UP (ref 150–400)
POTASSIUM SERPL-MCNC: 4.2 MMOL/L — SIGNIFICANT CHANGE UP (ref 3.5–5.3)
POTASSIUM SERPL-SCNC: 4.2 MMOL/L — SIGNIFICANT CHANGE UP (ref 3.5–5.3)
RBC # BLD: 3.62 M/UL — LOW (ref 3.8–5.2)
RBC # FLD: 15.6 % — HIGH (ref 10.3–14.5)
SODIUM SERPL-SCNC: 138 MMOL/L — SIGNIFICANT CHANGE UP (ref 135–145)
WBC # BLD: 8.65 K/UL — SIGNIFICANT CHANGE UP (ref 3.8–10.5)
WBC # FLD AUTO: 8.65 K/UL — SIGNIFICANT CHANGE UP (ref 3.8–10.5)

## 2018-07-25 RX ADMIN — PIPERACILLIN AND TAZOBACTAM 25 GRAM(S): 4; .5 INJECTION, POWDER, LYOPHILIZED, FOR SOLUTION INTRAVENOUS at 10:31

## 2018-07-25 RX ADMIN — PANTOPRAZOLE SODIUM 40 MILLIGRAM(S): 20 TABLET, DELAYED RELEASE ORAL at 06:17

## 2018-07-25 RX ADMIN — LOSARTAN POTASSIUM 50 MILLIGRAM(S): 100 TABLET, FILM COATED ORAL at 06:17

## 2018-07-25 RX ADMIN — CARBIDOPA AND LEVODOPA 1 TABLET(S): 25; 100 TABLET ORAL at 06:17

## 2018-07-25 RX ADMIN — APIXABAN 2.5 MILLIGRAM(S): 2.5 TABLET, FILM COATED ORAL at 18:08

## 2018-07-25 RX ADMIN — LACOSAMIDE 100 MILLIGRAM(S): 50 TABLET ORAL at 18:11

## 2018-07-25 RX ADMIN — Medication 50 MILLIGRAM(S): at 18:08

## 2018-07-25 RX ADMIN — APIXABAN 2.5 MILLIGRAM(S): 2.5 TABLET, FILM COATED ORAL at 06:17

## 2018-07-25 RX ADMIN — Medication 50 MILLIGRAM(S): at 06:17

## 2018-07-25 RX ADMIN — CARBIDOPA AND LEVODOPA 1 TABLET(S): 25; 100 TABLET ORAL at 21:25

## 2018-07-25 RX ADMIN — LACOSAMIDE 100 MILLIGRAM(S): 50 TABLET ORAL at 06:16

## 2018-07-25 RX ADMIN — PIPERACILLIN AND TAZOBACTAM 25 GRAM(S): 4; .5 INJECTION, POWDER, LYOPHILIZED, FOR SOLUTION INTRAVENOUS at 18:08

## 2018-07-25 RX ADMIN — CARBIDOPA AND LEVODOPA 1 TABLET(S): 25; 100 TABLET ORAL at 14:20

## 2018-07-25 RX ADMIN — PIPERACILLIN AND TAZOBACTAM 25 GRAM(S): 4; .5 INJECTION, POWDER, LYOPHILIZED, FOR SOLUTION INTRAVENOUS at 02:10
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
## 2018-07-25 NOTE — PROGRESS NOTE ADULT - PROBLEM SELECTOR PLAN 1
poss PNA  on room air  ID eval and follow up noted  emp ABX regimen in progress, Zosyn  monitor vs and HD and Sat  keep sat > 88 pct  am labs pending  at risk for aspiration  monitor for volume overload  nutrition  assist with ADL  dc planning  will follow  on dc will go with Augmentin as per ID

## 2018-07-26 ENCOUNTER — TRANSCRIPTION ENCOUNTER (OUTPATIENT)
Age: 83
End: 2018-07-26

## 2018-07-26 VITALS — HEART RATE: 94 BPM | SYSTOLIC BLOOD PRESSURE: 150 MMHG | DIASTOLIC BLOOD PRESSURE: 90 MMHG

## 2018-07-26 LAB
ANION GAP SERPL CALC-SCNC: 10 MMOL/L — SIGNIFICANT CHANGE UP (ref 5–17)
BUN SERPL-MCNC: 11 MG/DL — SIGNIFICANT CHANGE UP (ref 7–23)
CALCIUM SERPL-MCNC: 9.3 MG/DL — SIGNIFICANT CHANGE UP (ref 8.4–10.5)
CHLORIDE SERPL-SCNC: 108 MMOL/L — SIGNIFICANT CHANGE UP (ref 96–108)
CO2 SERPL-SCNC: 22 MMOL/L — SIGNIFICANT CHANGE UP (ref 22–31)
CREAT SERPL-MCNC: 0.84 MG/DL — SIGNIFICANT CHANGE UP (ref 0.5–1.3)
FERRITIN SERPL-MCNC: 21 NG/ML — SIGNIFICANT CHANGE UP (ref 15–150)
FOLATE SERPL-MCNC: 3.8 NG/ML — LOW
GLUCOSE SERPL-MCNC: 85 MG/DL — SIGNIFICANT CHANGE UP (ref 70–99)
HCT VFR BLD CALC: 29.6 % — LOW (ref 34.5–45)
HGB BLD-MCNC: 9.4 G/DL — LOW (ref 11.5–15.5)
IRON SATN MFR SERPL: 15 UG/DL — LOW (ref 30–160)
IRON SATN MFR SERPL: 4 % — LOW (ref 14–50)
MCHC RBC-ENTMCNC: 25.9 PG — LOW (ref 27–34)
MCHC RBC-ENTMCNC: 31.8 GM/DL — LOW (ref 32–36)
MCV RBC AUTO: 81.5 FL — SIGNIFICANT CHANGE UP (ref 80–100)
NRBC # BLD: 0 /100 WBCS — SIGNIFICANT CHANGE UP (ref 0–0)
PLATELET # BLD AUTO: 322 K/UL — SIGNIFICANT CHANGE UP (ref 150–400)
POTASSIUM SERPL-MCNC: 4 MMOL/L — SIGNIFICANT CHANGE UP (ref 3.5–5.3)
POTASSIUM SERPL-SCNC: 4 MMOL/L — SIGNIFICANT CHANGE UP (ref 3.5–5.3)
RBC # BLD: 3.63 M/UL — LOW (ref 3.8–5.2)
RBC # FLD: 15.5 % — HIGH (ref 10.3–14.5)
SODIUM SERPL-SCNC: 140 MMOL/L — SIGNIFICANT CHANGE UP (ref 135–145)
TIBC SERPL-MCNC: 368 UG/DL — SIGNIFICANT CHANGE UP (ref 220–430)
UIBC SERPL-MCNC: 353 UG/DL — SIGNIFICANT CHANGE UP (ref 110–370)
VIT B12 SERPL-MCNC: 273 PG/ML — SIGNIFICANT CHANGE UP (ref 232–1245)
WBC # BLD: 6.55 K/UL — SIGNIFICANT CHANGE UP (ref 3.8–10.5)
WBC # FLD AUTO: 6.55 K/UL — SIGNIFICANT CHANGE UP (ref 3.8–10.5)

## 2018-07-26 PROCEDURE — 82550 ASSAY OF CK (CPK): CPT

## 2018-07-26 PROCEDURE — 87641 MR-STAPH DNA AMP PROBE: CPT

## 2018-07-26 PROCEDURE — 36415 COLL VENOUS BLD VENIPUNCTURE: CPT

## 2018-07-26 PROCEDURE — 80048 BASIC METABOLIC PNL TOTAL CA: CPT

## 2018-07-26 PROCEDURE — 86140 C-REACTIVE PROTEIN: CPT

## 2018-07-26 PROCEDURE — 82607 VITAMIN B-12: CPT

## 2018-07-26 PROCEDURE — 83880 ASSAY OF NATRIURETIC PEPTIDE: CPT

## 2018-07-26 PROCEDURE — 85610 PROTHROMBIN TIME: CPT

## 2018-07-26 PROCEDURE — 70450 CT HEAD/BRAIN W/O DYE: CPT

## 2018-07-26 PROCEDURE — 99285 EMERGENCY DEPT VISIT HI MDM: CPT | Mod: 25

## 2018-07-26 PROCEDURE — 83605 ASSAY OF LACTIC ACID: CPT

## 2018-07-26 PROCEDURE — 97162 PT EVAL MOD COMPLEX 30 MIN: CPT

## 2018-07-26 PROCEDURE — 82746 ASSAY OF FOLIC ACID SERUM: CPT

## 2018-07-26 PROCEDURE — 82728 ASSAY OF FERRITIN: CPT

## 2018-07-26 PROCEDURE — 85027 COMPLETE CBC AUTOMATED: CPT

## 2018-07-26 PROCEDURE — 71045 X-RAY EXAM CHEST 1 VIEW: CPT

## 2018-07-26 PROCEDURE — 82553 CREATINE MB FRACTION: CPT

## 2018-07-26 PROCEDURE — 84443 ASSAY THYROID STIM HORMONE: CPT

## 2018-07-26 PROCEDURE — 87040 BLOOD CULTURE FOR BACTERIA: CPT

## 2018-07-26 PROCEDURE — 85730 THROMBOPLASTIN TIME PARTIAL: CPT

## 2018-07-26 PROCEDURE — 84484 ASSAY OF TROPONIN QUANT: CPT

## 2018-07-26 PROCEDURE — 83550 IRON BINDING TEST: CPT

## 2018-07-26 PROCEDURE — 93005 ELECTROCARDIOGRAM TRACING: CPT

## 2018-07-26 PROCEDURE — 96360 HYDRATION IV INFUSION INIT: CPT

## 2018-07-26 PROCEDURE — 87640 STAPH A DNA AMP PROBE: CPT

## 2018-07-26 PROCEDURE — 80053 COMPREHEN METABOLIC PANEL: CPT

## 2018-07-26 PROCEDURE — 96361 HYDRATE IV INFUSION ADD-ON: CPT

## 2018-07-26 PROCEDURE — 84145 PROCALCITONIN (PCT): CPT

## 2018-07-26 RX ORDER — CEFUROXIME AXETIL 250 MG
1 TABLET ORAL
Qty: 0 | Refills: 0 | COMMUNITY

## 2018-07-26 RX ORDER — LACOSAMIDE 50 MG/1
1 TABLET ORAL
Qty: 0 | Refills: 0 | COMMUNITY

## 2018-07-26 RX ORDER — LACOSAMIDE 50 MG/1
1 TABLET ORAL
Qty: 60 | Refills: 0 | OUTPATIENT
Start: 2018-07-26 | End: 2018-08-24

## 2018-07-26 RX ORDER — LOSARTAN POTASSIUM 100 MG/1
1 TABLET, FILM COATED ORAL
Qty: 30 | Refills: 0 | OUTPATIENT
Start: 2018-07-26 | End: 2018-08-24

## 2018-07-26 RX ORDER — LACTOBACILLUS ACIDOPHILUS 100MM CELL
2 CAPSULE ORAL
Qty: 30 | Refills: 0 | OUTPATIENT
Start: 2018-07-26 | End: 2018-08-09

## 2018-07-26 RX ORDER — VALSARTAN 80 MG/1
1 TABLET ORAL
Qty: 0 | Refills: 0 | COMMUNITY

## 2018-07-26 RX ADMIN — LOSARTAN POTASSIUM 50 MILLIGRAM(S): 100 TABLET, FILM COATED ORAL at 06:28

## 2018-07-26 RX ADMIN — CARBIDOPA AND LEVODOPA 1 TABLET(S): 25; 100 TABLET ORAL at 14:27

## 2018-07-26 RX ADMIN — APIXABAN 2.5 MILLIGRAM(S): 2.5 TABLET, FILM COATED ORAL at 06:28

## 2018-07-26 RX ADMIN — Medication 1 TABLET(S): at 08:46

## 2018-07-26 RX ADMIN — LACOSAMIDE 100 MILLIGRAM(S): 50 TABLET ORAL at 06:28

## 2018-07-26 RX ADMIN — PANTOPRAZOLE SODIUM 40 MILLIGRAM(S): 20 TABLET, DELAYED RELEASE ORAL at 06:28

## 2018-07-26 RX ADMIN — Medication 50 MILLIGRAM(S): at 06:28

## 2018-07-26 RX ADMIN — CARBIDOPA AND LEVODOPA 1 TABLET(S): 25; 100 TABLET ORAL at 06:28

## 2018-07-26 NOTE — DISCHARGE NOTE ADULT - MEDICATION SUMMARY - MEDICATIONS TO CHANGE
I will SWITCH the dose or number of times a day I take the medications listed below when I get home from the hospital:    Vimpat 50 mg oral tablet  -- 1 tab(s) by mouth 2 times a day

## 2018-07-26 NOTE — PROGRESS NOTE ADULT - ATTENDING COMMENTS
45minutes spent on this visit, 50% visit time spent in care co-ordination with other attendings and counselling patient  I have discussed care plan with patient and HCP,expressed understanding of problems treatment and their effect and side effects, alternatives in detail,I have asked if they have any questions and concerns and appropriately addressed them to best of my ability  Reviewed all diagonostic tests, lab results and drug drug interactions, and medications

## 2018-07-26 NOTE — PHYSICAL THERAPY INITIAL EVALUATION ADULT - ADDITIONAL COMMENTS
Pt is a poor historian.  Pt has 24/7 aides at [facility].  As per , pt ambulates w/ handheld assistance from pvt aide

## 2018-07-26 NOTE — DISCHARGE NOTE ADULT - PATIENT PORTAL LINK FT
You can access the [facility] Patient Portal, offered by [facility], by registering with the following website: [email or web address]

## 2018-07-26 NOTE — DISCHARGE NOTE ADULT - MEDICATION SUMMARY - MEDICATIONS TO TAKE
I will START or STAY ON the medications listed below when I get home from the hospital:    losartan 50 mg oral tablet  -- 1 tab(s) by mouth once a day  -- Indication: For Htn, if you are not taking valsartan    Eliquis 2.5 mg oral tablet  -- 1 tab(s) by mouth 2 times a day  -- Indication: For Dvt , home med    lacosamide 100 mg oral tablet  -- 1 tab(s) by mouth 2 times a day MDD:200 mg  -- Indication: For Seizure disorder    Sinemet 25 mg-100 mg oral tablet  -- 1 tab(s) by mouth 3 times a day  -- Indication: For Parkinson disease    Metoprolol Tartrate 50 mg oral tablet  -- 1 tab(s) by mouth 2 times a day  -- Indication: For Htn,     amoxicillin-clavulanate 500 mg-125 mg oral tablet  -- 1 tab(s) by mouth every 12 hours  -- Indication: For Pna    lactobacillus acidophilus oral tablet  -- 2 tab(s) by mouth once a day  -- Indication: For Suppl    omeprazole 20 mg oral delayed release capsule  -- 1 cap(s) by mouth once a day  -- Indication: For Gerd

## 2018-07-26 NOTE — PROGRESS NOTE ADULT - NSHPATTENDINGPLANDISCUSS_GEN_ALL_CORE
patient . dr acevedo, dr faustin, dr nieves

## 2018-07-26 NOTE — PROGRESS NOTE ADULT - PROBLEM SELECTOR PROBLEM 2
Pneumonia due to infectious organism, unspecified laterality, unspecified part of lung
Gastroesophageal reflux disease without esophagitis
Gastroesophageal reflux disease without esophagitis

## 2018-07-26 NOTE — DISCHARGE NOTE ADULT - MEDICATION SUMMARY - MEDICATIONS TO STOP TAKING
I will STOP taking the medications listed below when I get home from the hospital:    valsartan 80 mg oral tablet  -- 1 tab(s) by mouth once a day

## 2018-07-26 NOTE — PROGRESS NOTE ADULT - PROBLEM SELECTOR PLAN 1
frail  weak  dysphagia  asp pna likely  chest pt as needed  on oral ABX  at risk for future aspiration events  dc planning  assist with ADL  am labs pending  monitor vs and HD and Sat  keep sat > 88 pct  cough rx regimen PRN

## 2018-07-26 NOTE — PROGRESS NOTE ADULT - PROBLEM SELECTOR PROBLEM 1
Parkinson disease
Pneumonia due to infectious organism, unspecified laterality, unspecified part of lung
Pneumonia due to infectious organism, unspecified laterality, unspecified part of lung
Seizure disorder
Pneumonia due to infectious organism, unspecified laterality, unspecified part of lung
Pneumonia due to infectious organism, unspecified laterality, unspecified part of lung

## 2018-07-26 NOTE — DISCHARGE NOTE ADULT - CARE PLAN
Principal Discharge DX:	Pneumonia due to infectious organism, unspecified laterality, unspecified part of lung  Goal:	improved  Assessment and plan of treatment:	augmentin for3 days  Secondary Diagnosis:	Parkinson disease  Assessment and plan of treatment:	cont home meds  Secondary Diagnosis:	Gastroesophageal reflux disease without esophagitis  Assessment and plan of treatment:	omeprazole  Secondary Diagnosis:	Seizure disorder  Assessment and plan of treatment:	vimpat 100 mg bid, f up with your neuro

## 2018-07-26 NOTE — PROGRESS NOTE ADULT - PROVIDER SPECIALTY LIST ADULT
Infectious Disease
Internal Medicine
Internal Medicine
Neurology
Pulmonology
Internal Medicine

## 2018-07-26 NOTE — PHYSICAL THERAPY INITIAL EVALUATION ADULT - RANGE OF MOTION EXAMINATION, REHAB EVAL
bilateral ankles to neutral/bilateral lower extremity ROM was WFL (within functional limits)/deficits as listed below/bilateral upper extremity ROM was WFL (within functional limits)
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
## 2018-07-26 NOTE — PHYSICAL THERAPY INITIAL EVALUATION ADULT - GAIT DEVIATIONS NOTED, PT EVAL
decreased weight-shifting ability/decreased velocity of limb motion/decreased step length/pt retropulsive/decreased citlali
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
## 2018-07-28 LAB
CULTURE RESULTS: SIGNIFICANT CHANGE UP
CULTURE RESULTS: SIGNIFICANT CHANGE UP
SPECIMEN SOURCE: SIGNIFICANT CHANGE UP
SPECIMEN SOURCE: SIGNIFICANT CHANGE UP

## 2019-01-08 PROBLEM — G20 PARKINSON'S DISEASE: Chronic | Status: ACTIVE | Noted: 2018-07-23

## 2019-01-08 PROBLEM — G40.909 EPILEPSY, UNSPECIFIED, NOT INTRACTABLE, WITHOUT STATUS EPILEPTICUS: Chronic | Status: ACTIVE | Noted: 2018-07-23

## 2019-01-08 PROBLEM — D32.0 BENIGN NEOPLASM OF CEREBRAL MENINGES: Chronic | Status: ACTIVE | Noted: 2018-07-23

## 2019-01-17 ENCOUNTER — APPOINTMENT (OUTPATIENT)
Dept: OPHTHALMOLOGY | Facility: CLINIC | Age: 84
End: 2019-01-17
Payer: MEDICARE

## 2019-01-17 PROBLEM — Z00.00 ENCOUNTER FOR PREVENTIVE HEALTH EXAMINATION: Status: ACTIVE | Noted: 2019-01-17

## 2019-01-17 PROCEDURE — 92004 COMPRE OPH EXAM NEW PT 1/>: CPT

## 2019-02-01 ENCOUNTER — APPOINTMENT (OUTPATIENT)
Dept: OPHTHALMOLOGY | Facility: CLINIC | Age: 84
End: 2019-02-01
Payer: MEDICARE

## 2019-02-01 PROCEDURE — 92014 COMPRE OPH EXAM EST PT 1/>: CPT

## 2019-02-01 PROCEDURE — 92020 GONIOSCOPY: CPT

## 2019-03-21 ENCOUNTER — APPOINTMENT (OUTPATIENT)
Dept: OPHTHALMOLOGY | Facility: CLINIC | Age: 84
End: 2019-03-21
Payer: MEDICARE

## 2019-03-21 PROCEDURE — 92012 INTRM OPH EXAM EST PATIENT: CPT

## 2019-05-16 ENCOUNTER — NON-APPOINTMENT (OUTPATIENT)
Age: 84
End: 2019-05-16

## 2019-05-16 ENCOUNTER — APPOINTMENT (OUTPATIENT)
Dept: OPHTHALMOLOGY | Facility: CLINIC | Age: 84
End: 2019-05-16
Payer: MEDICARE

## 2019-05-16 PROCEDURE — 92012 INTRM OPH EXAM EST PATIENT: CPT

## 2019-06-07 ENCOUNTER — NON-APPOINTMENT (OUTPATIENT)
Age: 84
End: 2019-06-07

## 2019-06-07 ENCOUNTER — APPOINTMENT (OUTPATIENT)
Dept: OPHTHALMOLOGY | Facility: CLINIC | Age: 84
End: 2019-06-07
Payer: MEDICARE

## 2019-06-07 PROCEDURE — 92012 INTRM OPH EXAM EST PATIENT: CPT

## 2019-06-07 PROCEDURE — 76511 OPH US DX QUAN A-SCAN ONLY: CPT | Mod: RT

## 2019-07-29 ENCOUNTER — OUTPATIENT (OUTPATIENT)
Dept: OUTPATIENT SERVICES | Facility: HOSPITAL | Age: 84
LOS: 1 days | Discharge: HOME | End: 2019-07-29

## 2019-07-29 ENCOUNTER — NON-APPOINTMENT (OUTPATIENT)
Age: 84
End: 2019-07-29

## 2019-07-29 ENCOUNTER — APPOINTMENT (OUTPATIENT)
Dept: OPHTHALMOLOGY | Facility: AMBULATORY SURGERY CENTER | Age: 84
End: 2019-07-29
Payer: MEDICARE

## 2019-07-29 DIAGNOSIS — Z90.710 ACQUIRED ABSENCE OF BOTH CERVIX AND UTERUS: Chronic | ICD-10-CM

## 2019-07-29 PROCEDURE — 66984 XCAPSL CTRC RMVL W/O ECP: CPT | Mod: RT

## 2019-07-30 ENCOUNTER — APPOINTMENT (OUTPATIENT)
Dept: OPHTHALMOLOGY | Facility: CLINIC | Age: 84
End: 2019-07-30
Payer: MEDICARE

## 2019-07-30 ENCOUNTER — NON-APPOINTMENT (OUTPATIENT)
Age: 84
End: 2019-07-30

## 2019-07-30 PROCEDURE — 99024 POSTOP FOLLOW-UP VISIT: CPT

## 2019-08-06 ENCOUNTER — NON-APPOINTMENT (OUTPATIENT)
Age: 84
End: 2019-08-06

## 2019-08-06 ENCOUNTER — APPOINTMENT (OUTPATIENT)
Dept: OPHTHALMOLOGY | Facility: CLINIC | Age: 84
End: 2019-08-06
Payer: MEDICARE

## 2019-08-06 PROCEDURE — 99024 POSTOP FOLLOW-UP VISIT: CPT

## 2019-09-13 ENCOUNTER — APPOINTMENT (OUTPATIENT)
Dept: OPHTHALMOLOGY | Facility: CLINIC | Age: 84
End: 2019-09-13
Payer: MEDICARE

## 2019-09-13 ENCOUNTER — NON-APPOINTMENT (OUTPATIENT)
Age: 84
End: 2019-09-13

## 2019-09-13 PROCEDURE — 99024 POSTOP FOLLOW-UP VISIT: CPT

## 2019-10-28 ENCOUNTER — NON-APPOINTMENT (OUTPATIENT)
Age: 84
End: 2019-10-28

## 2019-10-28 ENCOUNTER — OUTPATIENT (OUTPATIENT)
Dept: OUTPATIENT SERVICES | Facility: HOSPITAL | Age: 84
LOS: 1 days | Discharge: HOME | End: 2019-10-28

## 2019-10-28 ENCOUNTER — APPOINTMENT (OUTPATIENT)
Dept: OPHTHALMOLOGY | Facility: AMBULATORY SURGERY CENTER | Age: 84
End: 2019-10-28
Payer: MEDICARE

## 2019-10-28 DIAGNOSIS — Z90.710 ACQUIRED ABSENCE OF BOTH CERVIX AND UTERUS: Chronic | ICD-10-CM

## 2019-10-28 PROCEDURE — 66984 XCAPSL CTRC RMVL W/O ECP: CPT | Mod: LT

## 2019-10-29 ENCOUNTER — NON-APPOINTMENT (OUTPATIENT)
Age: 84
End: 2019-10-29

## 2019-10-29 ENCOUNTER — APPOINTMENT (OUTPATIENT)
Dept: OPHTHALMOLOGY | Facility: CLINIC | Age: 84
End: 2019-10-29
Payer: MEDICARE

## 2019-10-29 PROCEDURE — 99024 POSTOP FOLLOW-UP VISIT: CPT

## 2019-11-07 ENCOUNTER — APPOINTMENT (OUTPATIENT)
Dept: OPHTHALMOLOGY | Facility: CLINIC | Age: 84
End: 2019-11-07

## 2019-11-07 ENCOUNTER — APPOINTMENT (OUTPATIENT)
Dept: OPHTHALMOLOGY | Facility: CLINIC | Age: 84
End: 2019-11-07
Payer: MEDICARE

## 2019-11-07 ENCOUNTER — NON-APPOINTMENT (OUTPATIENT)
Age: 84
End: 2019-11-07

## 2019-11-07 PROCEDURE — 99024 POSTOP FOLLOW-UP VISIT: CPT

## 2019-12-19 ENCOUNTER — APPOINTMENT (OUTPATIENT)
Dept: OPHTHALMOLOGY | Facility: CLINIC | Age: 84
End: 2019-12-19
Payer: MEDICARE

## 2019-12-19 ENCOUNTER — NON-APPOINTMENT (OUTPATIENT)
Age: 84
End: 2019-12-19

## 2019-12-19 PROCEDURE — 99024 POSTOP FOLLOW-UP VISIT: CPT

## 2020-03-13 ENCOUNTER — NON-APPOINTMENT (OUTPATIENT)
Age: 85
End: 2020-03-13

## 2020-03-13 ENCOUNTER — APPOINTMENT (OUTPATIENT)
Dept: OPHTHALMOLOGY | Facility: CLINIC | Age: 85
End: 2020-03-13
Payer: MEDICARE

## 2020-03-13 PROCEDURE — 92012 INTRM OPH EXAM EST PATIENT: CPT
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
## 2020-09-17 ENCOUNTER — APPOINTMENT (OUTPATIENT)
Dept: OPHTHALMOLOGY | Facility: CLINIC | Age: 85
End: 2020-09-17

## 2022-07-07 NOTE — H&P ADULT - ENDOCRINE
negative Odomzo Counseling- I discussed with the patient the risks of Odomzo including but not limited to nausea, vomiting, diarrhea, constipation, weight loss, changes in the sense of taste, decreased appetite, muscle spasms, and hair loss.  The patient verbalized understanding of the proper use and possible adverse effects of Odomzo.  All of the patient's questions and concerns were addressed.

## 2023-11-28 NOTE — PROGRESS NOTE ADULT - SUBJECTIVE AND OBJECTIVE BOX
Date/Time Patient Seen:  		  Referring MD:   Data Reviewed	       Patient is a 83y old  Female who presents with a chief complaint of pt passed out, possible seizure (23 Jul 2018 17:46)    vs and meds reviewed      Subjective/HPI     PAST MEDICAL & SURGICAL HISTORY:  Seizure disorder  Parkinson disease  Cerebral meningioma  H/O abdominal hysterectomy        Medication list         MEDICATIONS  (STANDING):  apixaban 2.5 milliGRAM(s) Oral every 12 hours  carbidopa/levodopa  25/100 1 Tablet(s) Oral three times a day  lacosamide 100 milliGRAM(s) Oral two times a day  losartan 50 milliGRAM(s) Oral daily  metoprolol tartrate 50 milliGRAM(s) Oral two times a day  pantoprazole    Tablet 40 milliGRAM(s) Oral before breakfast  piperacillin/tazobactam IVPB. 3.375 Gram(s) IV Intermittent every 8 hours    MEDICATIONS  (PRN):  LORazepam   Injectable 1 milliGRAM(s) IV Push every 4 hours PRN seizure         Vitals log        ICU Vital Signs Last 24 Hrs  T(C): 37.1 (24 Jul 2018 05:26), Max: 37.2 (23 Jul 2018 20:35)  T(F): 98.8 (24 Jul 2018 05:26), Max: 98.9 (23 Jul 2018 20:35)  HR: 90 (24 Jul 2018 05:26) (72 - 90)  BP: 156/84 (24 Jul 2018 05:26) (120/66 - 167/78)  BP(mean): --  ABP: --  ABP(mean): --  RR: 16 (24 Jul 2018 05:26) (16 - 22)  SpO2: 96% (24 Jul 2018 05:26) (95% - 100%)           Input and Output:  I&O's Detail      Lab Data                        10.0   13.00 )-----------( 356      ( 23 Jul 2018 15:53 )             32.3     07-23    137  |  104  |  22  ----------------------------<  115<H>  4.0   |  25  |  0.97    Ca    9.1      23 Jul 2018 15:53    TPro  6.5  /  Alb  3.1<L>  /  TBili  0.3  /  DBili  x   /  AST  14  /  ALT  <10<L>  /  AlkPhos  132<H>  07-23      CARDIAC MARKERS ( 23 Jul 2018 15:53 )  .000 ng/mL / x     / 29 U/L / x     / 0.3 ng/mL        Review of Systems	      Objective     Physical Examination    heart s1s2  lung dec BS    Pertinent Lab findings & Imaging      Grubbs:  NO   Adequate UO     I&O's Detail           Discussed with:     Cultures:	        Radiology
Date/Time Patient Seen:  		  Referring MD:   Data Reviewed	       Patient is a 83y old  Female who presents with a chief complaint of pt passed out, possible seizure (23 Jul 2018 17:46)  vs and meds reviewed  on ABX      Subjective/HPI     PAST MEDICAL & SURGICAL HISTORY:  Seizure disorder  Parkinson disease  Cerebral meningioma  H/O abdominal hysterectomy        Medication list         MEDICATIONS  (STANDING):  apixaban 2.5 milliGRAM(s) Oral every 12 hours  carbidopa/levodopa  25/100 1 Tablet(s) Oral three times a day  lacosamide 100 milliGRAM(s) Oral two times a day  losartan 50 milliGRAM(s) Oral daily  metoprolol tartrate 50 milliGRAM(s) Oral two times a day  pantoprazole    Tablet 40 milliGRAM(s) Oral before breakfast  piperacillin/tazobactam IVPB. 3.375 Gram(s) IV Intermittent every 8 hours    MEDICATIONS  (PRN):  LORazepam   Injectable 1 milliGRAM(s) IV Push every 4 hours PRN seizure         Vitals log        ICU Vital Signs Last 24 Hrs  T(C): 36.5 (25 Jul 2018 04:36), Max: 37.3 (24 Jul 2018 21:39)  T(F): 97.7 (25 Jul 2018 04:36), Max: 99.2 (24 Jul 2018 21:39)  HR: 87 (25 Jul 2018 04:36) (74 - 92)  BP: 158/76 (25 Jul 2018 04:36) (119/70 - 167/89)  BP(mean): --  ABP: --  ABP(mean): --  RR: 18 (25 Jul 2018 04:36) (16 - 18)  SpO2: 97% (25 Jul 2018 04:36) (92% - 100%)           Input and Output:  I&O's Detail    24 Jul 2018 07:01  -  25 Jul 2018 06:21  --------------------------------------------------------  IN:    IV PiggyBack: 100 mL    Oral Fluid: 300 mL  Total IN: 400 mL    OUT:  Total OUT: 0 mL    Total NET: 400 mL          Lab Data                        9.3    10.41 )-----------( 316      ( 24 Jul 2018 06:56 )             28.8     07-24    137  |  110<H>  |  13  ----------------------------<  94  3.8   |  23  |  0.72    Ca    9.0      24 Jul 2018 06:56    TPro  5.6<L>  /  Alb  2.7<L>  /  TBili  0.6  /  DBili  x   /  AST  14  /  ALT  <10<L>  /  AlkPhos  117  07-24      CARDIAC MARKERS ( 23 Jul 2018 15:53 )  .000 ng/mL / x     / 29 U/L / x     / 0.3 ng/mL        Review of Systems	      Objective     Physical Examination  heart s1s2  lung dec BS  abd soft  frail and weak        Pertinent Lab findings & Imaging      Romie:  NO   Adequate UO     I&O's Detail    24 Jul 2018 07:01  -  25 Jul 2018 06:21  --------------------------------------------------------  IN:    IV PiggyBack: 100 mL    Oral Fluid: 300 mL  Total IN: 400 mL    OUT:  Total OUT: 0 mL    Total NET: 400 mL               Discussed with:     Cultures:	        Radiology
Date/Time Patient Seen:  		  Referring MD:   Data Reviewed	       Patient is a 83y old  Female who presents with a chief complaint of pt passed out, possible seizure (23 Jul 2018 17:46)  vs and meds reviewed  on oral ABX      Subjective/HPI     PAST MEDICAL & SURGICAL HISTORY:  Seizure disorder  Parkinson disease  Cerebral meningioma  H/O abdominal hysterectomy        Medication list         MEDICATIONS  (STANDING):  amoxicillin  500 milliGRAM(s)/clavulanate 1 Tablet(s) Oral every 12 hours  apixaban 2.5 milliGRAM(s) Oral every 12 hours  carbidopa/levodopa  25/100 1 Tablet(s) Oral three times a day  lacosamide 100 milliGRAM(s) Oral two times a day  losartan 50 milliGRAM(s) Oral daily  metoprolol tartrate 50 milliGRAM(s) Oral two times a day  pantoprazole    Tablet 40 milliGRAM(s) Oral before breakfast    MEDICATIONS  (PRN):  LORazepam   Injectable 1 milliGRAM(s) IV Push every 4 hours PRN seizure         Vitals log        ICU Vital Signs Last 24 Hrs  T(C): 37.2 (26 Jul 2018 05:38), Max: 37.4 (25 Jul 2018 17:22)  T(F): 99 (26 Jul 2018 05:38), Max: 99.3 (25 Jul 2018 17:22)  HR: 94 (26 Jul 2018 05:38) (70 - 94)  BP: 134/85 (26 Jul 2018 05:38) (125/77 - 160/76)  BP(mean): --  ABP: --  ABP(mean): --  RR: 18 (26 Jul 2018 05:38) (18 - 18)  SpO2: 95% (26 Jul 2018 05:38) (94% - 98%)           Input and Output:  I&O's Detail    24 Jul 2018 07:01  -  25 Jul 2018 07:00  --------------------------------------------------------  IN:    IV PiggyBack: 100 mL    Oral Fluid: 300 mL  Total IN: 400 mL    OUT:  Total OUT: 0 mL    Total NET: 400 mL      25 Jul 2018 07:01  -  26 Jul 2018 06:51  --------------------------------------------------------  IN:    Oral Fluid: 740 mL  Total IN: 740 mL    OUT:    Voided: 2 mL  Total OUT: 2 mL    Total NET: 738 mL          Lab Data                        9.5    8.65  )-----------( 291      ( 25 Jul 2018 08:04 )             30.4     07-25    138  |  108  |  9   ----------------------------<  78  4.2   |  25  |  0.78    Ca    8.9      25 Jul 2018 08:04    TPro  5.6<L>  /  Alb  2.7<L>  /  TBili  0.6  /  DBili  x   /  AST  14  /  ALT  <10<L>  /  AlkPhos  117  07-24            Review of Systems	      Objective     Physical Examination    heart s1s2  lung dec BS  abd soft      Pertinent Lab findings & Imaging      Romie:  NO   Adequate UO     I&O's Detail    24 Jul 2018 07:01  -  25 Jul 2018 07:00  --------------------------------------------------------  IN:    IV PiggyBack: 100 mL    Oral Fluid: 300 mL  Total IN: 400 mL    OUT:  Total OUT: 0 mL    Total NET: 400 mL      25 Jul 2018 07:01  -  26 Jul 2018 06:51  --------------------------------------------------------  IN:    Oral Fluid: 740 mL  Total IN: 740 mL    OUT:    Voided: 2 mL  Total OUT: 2 mL    Total NET: 738 mL               Discussed with:     Cultures:	        Radiology
MENG LONDON is a 83yFemale , patient examined and chart reviewed.     INTERVAL HPI/ OVERNIGHT EVENTS:   Afebrile. No events.    PAST MEDICAL & SURGICAL HISTORY:  Seizure disorder  Parkinson disease  Cerebral meningioma  H/O abdominal hysterectomy      For details regarding the patient's social history, family history, and other miscellaneous elements, please refer the initial infectious diseases consultation and/or the admitting history and physical examination for this admission.    ROS:  CONSTITUTIONAL:  Negative fever or chills  EYES:  Negative  blurry vision or double vision  CARDIOVASCULAR:  Negative for chest pain or palpitations  RESPIRATORY:  Negative for cough, wheezing, or SOB   GASTROINTESTINAL:  Negative for nausea, vomiting, diarrhea, constipation, or abdominal pain  GENITOURINARY:  Negative frequency, urgency or dysuria  NEUROLOGIC:  No headache, dizziness, lightheadedness +confusion  All other systems were reviewed and are negative         Current inpatient medications :    ANTIBIOTICS/RELEVANT:  piperacillin/tazobactam IVPB. 3.375 Gram(s) IV Intermittent every 8 hours      apixaban 2.5 milliGRAM(s) Oral every 12 hours  carbidopa/levodopa  25/100 1 Tablet(s) Oral three times a day  lacosamide 100 milliGRAM(s) Oral two times a day  LORazepam   Injectable 1 milliGRAM(s) IV Push every 4 hours PRN  losartan 50 milliGRAM(s) Oral daily  metoprolol tartrate 50 milliGRAM(s) Oral two times a day  pantoprazole    Tablet 40 milliGRAM(s) Oral before breakfast      Objective:    07-24 @ 07:01 - 07-25 @ 07:00  --------------------------------------------------------  IN: 400 mL / OUT: 0 mL / NET: 400 mL    07-25 @ 07:01 - 07-25 @ 14:42  --------------------------------------------------------  IN: 300 mL / OUT: 0 mL / NET: 300 mL      T(C): 37.3 (07-25-18 @ 14:20), Max: 37.3 (07-24-18 @ 21:39)  HR: 89 (07-25-18 @ 14:20) (70 - 92)  BP: 138/78 (07-25-18 @ 14:20) (135/81 - 167/89)  RR: 18 (07-25-18 @ 14:20) (16 - 18)  SpO2: 94% (07-25-18 @ 14:20) (92% - 98%)  Wt(kg): --      Physical Exam:  General: no acute distress  Eyes: sclera anicteric, pupils equal and reactive to light  ENMT: buccal mucosa moist, pharynx not injected  Neck: supple, trachea midline  Lungs: clear, no wheeze/rhonchi  Cardiovascular: regular rate and rhythm, S1 S2  Abdomen: soft, nontender, no organomegaly present, bowel sounds normal  Neurological: alert and oriented x1, Cranial Nerves II-XII grossly intact  Skin: no increased ecchymosis/petechiae/purpura  Lymph Nodes: no palpable cervical/supraclavicular lymph nodes enlargements  Extremities: no cyanosis/clubbing/edema      LABS:                          9.5    8.65  )-----------( 291      ( 25 Jul 2018 08:04 )             30.4       07-25    138  |  108  |  9   ----------------------------<  78  4.2   |  25  |  0.78    Ca    8.9      25 Jul 2018 08:04    TPro  5.6<L>  /  Alb  2.7<L>  /  TBili  0.6  /  DBili  x   /  AST  14  /  ALT  <10<L>  /  AlkPhos  117  07-24      PT/INR - ( 23 Jul 2018 15:53 )   PT: 16.4 sec;   INR: 1.49 ratio         PTT - ( 23 Jul 2018 15:53 )  PTT:29.6 sec      MICROBIOLOGY:    Culture - Blood (collected 23 Jul 2018 22:17)  Source: .Blood Blood  Preliminary Report (24 Jul 2018 23:01):    No growth to date.    Culture - Blood (collected 23 Jul 2018 22:17)  Source: .Blood Blood  Preliminary Report (24 Jul 2018 23:01):    No growth to date.    RADIOLOGY & ADDITIONAL STUDIES:      EXAM:  XR CHEST PORTABLE URGENT 1V                            PROCEDURE DATE:  07/23/2018      INTERPRETATION:  No prior studies present for comparison    Clinical information: Altered mental status    Portable exam, 3:59 PM    Cardiac monitor leads overlie the chest. Surgical clips overlie the   medial aspect of the right lung apex.    Small focus of airspace disease, left paracardiac region. No effusion. No   vascular congestion. Heart size appears enlarged although magnified   secondary to portable technique. Aorta shows atherosclerotic changes. Old   right rib fracture present. Old left rib fractures present. Multiple   kyphoplasties present lower dorsal spine. Mild scoliosis present   dorsal-lumbar spine.  Soft tissue calcifications present overlying the distal end of the left   clavicle.    IMPRESSION: See above report, advise follow-up study.      Assessment :  82 y/o F pt with hx of HTN, seizure disorder, Cerebral Meningioma Parkinson's Disease presented  admitted with seizures   Poss asp pna thogh CXR not that impressive  Afebrile  Leukocyctosis multifactorial resolved  Also reportedly had diarrhea but now none  Swallow eval noted with no overt s/s of aspiration    Plan :   Cont Zosyn  Change to po Augmentin in am x 3 days  Asp precautions  Increase activity      Continue with present regiment.  Appropriate use of antibiotics and adverse effects reviewed.      I have discussed the above plan of care with patient's family in detail. They expressed understanding of the  treatment plan . Risks, benefits and alternatives discussed in detail. I have asked if they have any questions or concerns and appropriately addressed them to the best of my ability .    > 35 minutes were spent in direct patient care reviewing notes, medications ,labs data/ imaging , discussion with multidisciplinary team.    Thank you for allowing me to participate in care of your patient .    Santosh Martinez MD  Infectious Disease  [phone]
MENG LONDON is a 83yFemale , patient examined and chart reviewed.     INTERVAL HPI/ OVERNIGHT EVENTS:   Awake confused. No distress.    PAST MEDICAL & SURGICAL HISTORY:  Seizure disorder  Parkinson disease  Cerebral meningioma  H/O abdominal hysterectomy    For details regarding the patient's social history, family history, and other miscellaneous elements, please refer the initial infectious diseases consultation and/or the admitting history and physical examination for this admission.    ROS:  CONSTITUTIONAL:  Negative fever or chills  EYES:  Negative  blurry vision or double vision  CARDIOVASCULAR:  Negative for chest pain or palpitations  RESPIRATORY:  Negative for cough, wheezing, or SOB   GASTROINTESTINAL:  Negative for nausea, vomiting, diarrhea, constipation, or abdominal pain  GENITOURINARY:  Negative frequency, urgency or dysuria  NEUROLOGIC:  No headache, dizziness, lightheadedness +Confusion,  All other systems were reviewed and are negative     Current inpatient medications :    ANTIBIOTICS/RELEVANT:  piperacillin/tazobactam IVPB. 3.375 Gram(s) IV Intermittent every 8 hours      apixaban 2.5 milliGRAM(s) Oral every 12 hours  carbidopa/levodopa  25/100 1 Tablet(s) Oral three times a day  lacosamide 100 milliGRAM(s) Oral two times a day  LORazepam   Injectable 1 milliGRAM(s) IV Push every 4 hours PRN  losartan 50 milliGRAM(s) Oral daily  metoprolol tartrate 50 milliGRAM(s) Oral two times a day  pantoprazole    Tablet 40 milliGRAM(s) Oral before breakfast      Objective:    07-24 @ 07:01  -  07-24 @ 22:57  --------------------------------------------------------  IN: 400 mL / OUT: 0 mL / NET: 400 mL      T(C): 37.3 (07-24-18 @ 21:39), Max: 37.3 (07-24-18 @ 21:39)  HR: 83 (07-24-18 @ 21:39) (74 - 92)  BP: 167/89 (07-24-18 @ 21:39) (119/70 - 167/89)  RR: 18 (07-24-18 @ 21:39) (16 - 18)  SpO2: 98% (07-24-18 @ 21:39) (92% - 100%)  Wt(kg): --      Physical Exam:  General: no acute distress  Eyes: sclera anicteric, pupils equal and reactive to light  ENMT: buccal mucosa moist, pharynx not injected  Neck: supple, trachea midline  Lungs: Decreased no wheeze/rhonchi  Cardiovascular: regular rate and rhythm, S1 S2  Abdomen: soft, nontender, no organomegaly present, bowel sounds normal  Neurological: alert and oriented x0 Cranial Nerves II-XII grossly intact  Skin: no increased ecchymosis/petechiae/purpura  Lymph Nodes: no palpable cervical/supraclavicular lymph nodes enlargements  Extremities: no cyanosis/clubbing/edema      LABS:                          9.3    10.41 )-----------( 316      ( 24 Jul 2018 06:56 )             28.8       07-24    137  |  110<H>  |  13  ----------------------------<  94  3.8   |  23  |  0.72    Ca    9.0      24 Jul 2018 06:56    TPro  5.6<L>  /  Alb  2.7<L>  /  TBili  0.6  /  DBili  x   /  AST  14  /  ALT  <10<L>  /  AlkPhos  117  07-24      PT/INR - ( 23 Jul 2018 15:53 )   PT: 16.4 sec;   INR: 1.49 ratio         PTT - ( 23 Jul 2018 15:53 )  PTT:29.6 sec    MICROBIOLOGY:  PENDING    RADIOLOGY & ADDITIONAL STUDIES:    EXAM:  XR CHEST PORTABLE URGENT 1V                            PROCEDURE DATE:  07/23/2018        INTERPRETATION:  No prior studies present for comparison    Clinical information: Altered mental status    Portable exam, 3:59 PM    Cardiac monitor leads overlie the chest. Surgical clips overlie the   medial aspect of the right lung apex.    Small focus of airspace disease, left paracardiac region. No effusion. No   vascular congestion. Heart size appears enlarged although magnified   secondary to portable technique. Aorta shows atherosclerotic changes. Old   right rib fracture present. Old left rib fractures present. Multiple   kyphoplasties present lower dorsal spine. Mild scoliosis present   dorsal-lumbar spine.  Soft tissue calcifications present overlying the distal end of the left   clavicle.    IMPRESSION: See above report, advise follow-up study.        Assessment :  84 y/o F pt with hx of HTN, seizure disorder, Cerebral Meningioma Parkinson's Disease presented  admitted with seizures   Poss asp pna thogh CXR not that impressive  Afebrile  Leukocyctosis multifactorial resolved  Also reportedly had diarrhea but now none  Swallow eval noted with no overt s/s of aspiration    Plan :   Cont Zosyn for another 24 hours then will change to po Augmentin x 3 days  Swallow study   Pt with no diarrhea- dc Cdiff precautions  Asp precautions      Continue with present regiment.  Appropriate use of antibiotics and adverse effects reviewed.      I have discussed the above plan of care with patient/ family in detail. They expressed understanding of the  treatment plan . Risks, benefits and alternatives discussed in detail. I have asked if they have any questions or concerns and appropriately addressed them to the best of my ability .    > 35 minutes were spent in direct patient care reviewing notes, medications ,labs data/ imaging , discussion with multidisciplinary team.    Thank you for allowing me to participate in care of your patient .    Santosh Martinez MD  Infectious Disease  [phone]
MENG LONDON is a 83yFemale , patient examined and chart reviewed.    INTERVAL HPI/ OVERNIGHT EVENTS:   Stable. No events.    PAST MEDICAL & SURGICAL HISTORY:  Seizure disorder  Parkinson disease  Cerebral meningioma  H/O abdominal hysterectomy    For details regarding the patient's social history, family history, and other miscellaneous elements, please refer the initial infectious diseases consultation and/or the admitting history and physical examination for this admission.    ROS:  CONSTITUTIONAL:  Negative fever or chills, feels well, good appetite  EYES:  Negative  blurry vision or double vision  CARDIOVASCULAR:  Negative for chest pain or palpitations  RESPIRATORY:  Negative for cough, wheezing, or SOB   GASTROINTESTINAL:  Negative for nausea, vomiting, diarrhea, constipation, or abdominal pain  GENITOURINARY:  Negative frequency, urgency or dysuria  NEUROLOGIC:  No headache, dizziness, lightheadedness +confusion  All other systems were reviewed and are negative     Current inpatient medications :    ANTIBIOTICS/RELEVANT:  amoxicillin  500 milliGRAM(s)/clavulanate 1 Tablet(s) Oral every 12 hours      apixaban 2.5 milliGRAM(s) Oral every 12 hours  carbidopa/levodopa  25/100 1 Tablet(s) Oral three times a day  lacosamide 100 milliGRAM(s) Oral two times a day  LORazepam   Injectable 1 milliGRAM(s) IV Push every 4 hours PRN  losartan 50 milliGRAM(s) Oral daily  metoprolol tartrate 50 milliGRAM(s) Oral two times a day  pantoprazole    Tablet 40 milliGRAM(s) Oral before breakfast      Objective:    07-25 @ 07:01 - 07-26 @ 07:00  --------------------------------------------------------  IN: 740 mL / OUT: 2 mL / NET: 738 mL    07-26 @ 07:01 - 07-26 @ 11:36  --------------------------------------------------------  IN: 240 mL / OUT: 0 mL / NET: 240 mL      T(C): 37 (07-26-18 @ 09:45), Max: 37.4 (07-25-18 @ 17:22)  HR: 84 (07-26-18 @ 09:45) (84 - 94)  BP: 146/78 (07-26-18 @ 09:45) (125/77 - 160/76)  RR: 16 (07-26-18 @ 09:45) (16 - 18)  SpO2: 94% (07-26-18 @ 09:45) (94% - 98%)  Wt(kg): --      Physical Exam:  General: no acute distress  Eyes: sclera anicteric, pupils equal and reactive to light  ENMT: buccal mucosa moist, pharynx not injected  Neck: supple, trachea midline  Lungs: clear, no wheeze/rhonchi  Cardiovascular: regular rate and rhythm, S1 S2  Abdomen: soft, nontender, no organomegaly present, bowel sounds normal  Neurological: alert and oriented x1, Cranial Nerves II-XII grossly intact  Skin: no increased ecchymosis/petechiae/purpura  Lymph Nodes: no palpable cervical/supraclavicular lymph nodes enlargements  Extremities: no cyanosis/clubbing/edema        LABS:                          9.4    6.55  )-----------( 322      ( 26 Jul 2018 06:49 )             29.6       07-26    140  |  108  |  11  ----------------------------<  85  4.0   |  22  |  0.84    Ca    9.3      26 Jul 2018 06:49      Assessment :  84 y/o F pt with hx of HTN, seizure disorder, Cerebral Meningioma Parkinson's Disease presented  admitted with seizures   Poss asp pna thogh CXR not that impressive  Afebrile  Leukocytosis multifactorial resolved  Also reportedly had diarrhea but now none  Swallow eval noted with no overt s/s of aspiration  Overall stable    Plan :   Off Zosyn  Complete po Augmentin x 3 days  Asp precautions  Increase activity    Continue with present regiment.  Appropriate use of antibiotics and adverse effects reviewed.      I have discussed the above plan of care with patient/ family in detail. They expressed understanding of the  treatment plan . Risks, benefits and alternatives discussed in detail. I have asked if they have any questions or concerns and appropriately addressed them to the best of my ability .    > 35 minutes were spent in direct patient care reviewing notes, medications ,labs data/ imaging , discussion with multidisciplinary team.    Thank you for allowing me to participate in care of your patient .    Santosh Martinez MD  Infectious Disease  [phone]
Neurology follow up note    MENG [id number]      Interval History:    Patient feels ok no new complaints.    MEDICATIONS    apixaban 2.5 milliGRAM(s) Oral every 12 hours  carbidopa/levodopa  25/100 1 Tablet(s) Oral three times a day  lacosamide 100 milliGRAM(s) Oral two times a day  LORazepam   Injectable 1 milliGRAM(s) IV Push every 4 hours PRN  losartan 50 milliGRAM(s) Oral daily  metoprolol tartrate 50 milliGRAM(s) Oral two times a day  pantoprazole    Tablet 40 milliGRAM(s) Oral before breakfast  piperacillin/tazobactam IVPB. 3.375 Gram(s) IV Intermittent every 8 hours      Allergies    No Known Allergies    Intolerances            Vital Signs Last 24 Hrs  T(C): 36.5 (25 Jul 2018 04:36), Max: 37.3 (24 Jul 2018 21:39)  T(F): 97.7 (25 Jul 2018 04:36), Max: 99.2 (24 Jul 2018 21:39)  HR: 87 (25 Jul 2018 04:36) (74 - 92)  BP: 158/76 (25 Jul 2018 04:36) (119/70 - 167/89)  BP(mean): --  RR: 18 (25 Jul 2018 04:36) (16 - 18)  SpO2: 97% (25 Jul 2018 04:36) (92% - 100%)    REVIEW OF SYSTEMS:     Constitutional: No fever, chills, fatigue, weakness  Eyes: no eye pain, visual disturbances, or discharge  ENT:  No difficulty hearing, tinnitus, vertigo; No sinus or throat pain  Neck: No pain or stiffness  Respiratory: No cough, dyspnea, wheezing   Cardiovascular: No chest pain, palpitations,   Gastrointestinal: No abdominal or epigastric pain. No nausea, vomiting  No diarrhea or constipation.   Genitourinary: No dysuria, frequency, hematuria or incontinence  Neurological: No headaches, lightheadedness, vertigo, numbness or tremors  Psychiatric: No depression, anxiety, mood swings or difficulty sleeping  Musculoskeletal: No joint pain or swelling; No muscle, back or extremity pain  Skin: No itching, burning, rashes or lesions   Lymph Nodes: No enlarged glands  Endocrine: No heat or cold intolerance; No hair loss   Allergy and Immunologic: No hives or eczema    On Neurological Examination::      The patient is awake, alert.      Location was hospital.  She was able to name simple objects.  The patient was forgetful, does not know why she came to the hospital.  Extraocular movements were intact.      Pupils were equal, round, and reactive bilaterally, 3 mm to 2.      Speech was fluent.      Tongue, no bite otilia.      Motor:  Bilateral upper and lower were 4/5.      positive hand tremors     Sensory:  Bilateral upper and lower were intact to light touch.  No drift.    Follow simple commands      GENERAL Exam: Nontoxic , No Acute Distress   	  HEENT:  normocephalic, atraumatic  		  LUNGS: Clear bilaterally    	  HEART: Normal S1S2   No murmur RRR        	  GI/ ABDOMEN:  Soft  Non tender    EXTREMITIES:   No Edema  No Clubbing  No Cyanosis No Edema    MUSCULOSKELETAL:  decreased Range of Motion all 4 extremities   	   SKIN: Normal  No Ecchymosis               LABS:  CBC Full  -  ( 24 Jul 2018 06:56 )  WBC Count : 10.41 K/uL  Hemoglobin : 9.3 g/dL  Hematocrit : 28.8 %  Platelet Count - Automated : 316 K/uL  Mean Cell Volume : 81.8 fl  Mean Cell Hemoglobin : 26.4 pg  Mean Cell Hemoglobin Concentration : 32.3 gm/dL  Auto Neutrophil # : x  Auto Lymphocyte # : x  Auto Monocyte # : x  Auto Eosinophil # : x  Auto Basophil # : x  Auto Neutrophil % : x  Auto Lymphocyte % : x  Auto Monocyte % : x  Auto Eosinophil % : x  Auto Basophil % : x      07-24    137  |  110<H>  |  13  ----------------------------<  94  3.8   |  23  |  0.72    Ca    9.0      24 Jul 2018 06:56    TPro  5.6<L>  /  Alb  2.7<L>  /  TBili  0.6  /  DBili  x   /  AST  14  /  ALT  <10<L>  /  AlkPhos  117  07-24    Hemoglobin A1C:     LIVER FUNCTIONS - ( 24 Jul 2018 06:56 )  Alb: 2.7 g/dL / Pro: 5.6 g/dL / ALK PHOS: 117 U/L / ALT: <10 U/L DA / AST: 14 U/L / GGT: x           Vitamin B12   PT/INR - ( 23 Jul 2018 15:53 )   PT: 16.4 sec;   INR: 1.49 ratio         PTT - ( 23 Jul 2018 15:53 )  PTT:29.6 sec      RADIOLOGY    ANALYSIS AND PLAN:  An 83-year-old with possible seizure event, history of Parkinson's and meningioma.  1.	For possible seizure event, we will increase the patient's Vimpat to 100 mg two times a day.  2.	I would recommend Ativan 1 mg IV push q.6h. p.r.n. for breakthrough seizures.  3.	Seizure precautions.  4.	For history of Parkinson's disease, continue the patient on her Sinemet.  5.	For history of meningioma, appears to be stable, unclear what previous sizes of meningiomas are.  6.	I would recommend aspiration precautions.  7.	Infectious Disease antibiotics as needed   8.	Fall precautions.  9.	spoke to spouse, Osmin at [phone], alternate number is [phone]. 7/25/18  10.	no new events     Thank you for the courtesy of consultation.      Physical therapy evaluation as tolerated  OOB to chair/ambulation with assistance only if possible.    Greater than 40 minutes spent in direct patient care reviewing  the notes, lab data/ imaging , discussion with multidisciplinary team.
Neurology follow up note    MENG [id number]      Interval History:    Patient feels ok no new complaints.    MEDICATIONS    apixaban 2.5 milliGRAM(s) Oral every 12 hours  carbidopa/levodopa  25/100 1 Tablet(s) Oral three times a day  lacosamide 100 milliGRAM(s) Oral two times a day  LORazepam   Injectable 1 milliGRAM(s) IV Push every 4 hours PRN  losartan 50 milliGRAM(s) Oral daily  metoprolol tartrate 50 milliGRAM(s) Oral two times a day  pantoprazole    Tablet 40 milliGRAM(s) Oral before breakfast  piperacillin/tazobactam IVPB. 3.375 Gram(s) IV Intermittent every 8 hours      Allergies    No Known Allergies    Intolerances        Height (cm): 154.94 (07-23 @ 15:08)  Weight (kg): 47.6 (07-23 @ 15:08)  BMI (kg/m2): 19.8 (07-23 @ 15:08)    Vital Signs Last 24 Hrs  T(C): 36.4 (24 Jul 2018 09:30), Max: 37.2 (23 Jul 2018 20:35)  T(F): 97.6 (24 Jul 2018 09:30), Max: 98.9 (23 Jul 2018 20:35)  HR: 80 (24 Jul 2018 09:30) (72 - 90)  BP: 136/67 (24 Jul 2018 09:30) (120/66 - 167/78)  BP(mean): --  RR: 16 (24 Jul 2018 09:30) (16 - 22)  SpO2: 100% (24 Jul 2018 09:30) (95% - 100%)      REVIEW OF SYSTEMS:     Constitutional: No fever, chills, fatigue, weakness  Eyes: no eye pain, visual disturbances, or discharge  ENT:  No difficulty hearing, tinnitus, vertigo; No sinus or throat pain  Neck: No pain or stiffness  Respiratory: No cough, dyspnea, wheezing   Cardiovascular: No chest pain, palpitations,   Gastrointestinal: No abdominal or epigastric pain. No nausea, vomiting  No diarrhea or constipation.   Genitourinary: No dysuria, frequency, hematuria or incontinence  Neurological: No headaches, lightheadedness, vertigo, numbness or tremors  Psychiatric: No depression, anxiety, mood swings or difficulty sleeping  Musculoskeletal: No joint pain or swelling; No muscle, back or extremity pain  Skin: No itching, burning, rashes or lesions   Lymph Nodes: No enlarged glands  Endocrine: No heat or cold intolerance; No hair loss   Allergy and Immunologic: No hives or eczema    On Neurological Examination::      The patient is awake, alert.      Location was hospital.  She was able to name simple objects.  The patient was forgetful, does not know why she came to the hospital.  Extraocular movements were intact.      Pupils were equal, round, and reactive bilaterally, 3 mm to 2.      Speech was fluent.      Tongue, no bite otilia.      Motor:  Bilateral upper and lower were 4/5.      positive hand tremors     Sensory:  Bilateral upper and lower were intact to light touch.  No drift.    Follow simple commands      GENERAL Exam: Nontoxic , No Acute Distress   	  HEENT:  normocephalic, atraumatic  		  LUNGS: Clear bilaterally    	  HEART: Normal S1S2   No murmur RRR        	  GI/ ABDOMEN:  Soft  Non tender    EXTREMITIES:   No Edema  No Clubbing  No Cyanosis No Edema    MUSCULOSKELETAL:  decreased Range of Motion all 4 extremities   	   SKIN: Normal  No Ecchymosis               LABS:  CBC Full  -  ( 24 Jul 2018 06:56 )  WBC Count : 10.41 K/uL  Hemoglobin : 9.3 g/dL  Hematocrit : 28.8 %  Platelet Count - Automated : 316 K/uL  Mean Cell Volume : 81.8 fl  Mean Cell Hemoglobin : 26.4 pg  Mean Cell Hemoglobin Concentration : 32.3 gm/dL  Auto Neutrophil # : x  Auto Lymphocyte # : x  Auto Monocyte # : x  Auto Eosinophil # : x  Auto Basophil # : x  Auto Neutrophil % : x  Auto Lymphocyte % : x  Auto Monocyte % : x  Auto Eosinophil % : x  Auto Basophil % : x      07-24    137  |  110<H>  |  13  ----------------------------<  94  3.8   |  23  |  0.72    Ca    9.0      24 Jul 2018 06:56    TPro  5.6<L>  /  Alb  2.7<L>  /  TBili  0.6  /  DBili  x   /  AST  14  /  ALT  <10<L>  /  AlkPhos  117  07-24    Hemoglobin A1C:     LIVER FUNCTIONS - ( 24 Jul 2018 06:56 )  Alb: 2.7 g/dL / Pro: 5.6 g/dL / ALK PHOS: 117 U/L / ALT: <10 U/L DA / AST: 14 U/L / GGT: x           Vitamin B12   PT/INR - ( 23 Jul 2018 15:53 )   PT: 16.4 sec;   INR: 1.49 ratio         PTT - ( 23 Jul 2018 15:53 )  PTT:29.6 sec      RADIOLOGY    ANALYSIS AND PLAN:  An 83-year-old with possible seizure event, history of Parkinson's and meningioma.  1.	For possible seizure event, we will increase the patient's Vimpat to 100 mg two times a day.  2.	I would recommend Ativan 1 mg IV push q.6h. p.r.n. for breakthrough seizures.  3.	Seizure precautions.  4.	For history of Parkinson's disease, continue the patient on her Sinemet.  5.	For history of meningioma, appears to be stable, unclear what previous sizes of meningiomas are.  6.	I would recommend aspiration precautions.  7.	Infectious Disease antibiotics as needed   8.	Fall precautions.  9.	spoke to spouse, Osmin at [phone], alternate number is [phone]. 7/24/18    Thank you for the courtesy of consultation.      Physical therapy evaluation as tolerated  OOB to chair/ambulation with assistance only if possible.    Greater than 45 minutes spent in direct patient care reviewing  the notes, lab data/ imaging , discussion with multidisciplinary team.
Neurology follow up note    MENG [id number]      Interval History:    Patient feels ok no new complaints seen with spouse     MEDICATIONS    amoxicillin  500 milliGRAM(s)/clavulanate 1 Tablet(s) Oral every 12 hours  apixaban 2.5 milliGRAM(s) Oral every 12 hours  carbidopa/levodopa  25/100 1 Tablet(s) Oral three times a day  lacosamide 100 milliGRAM(s) Oral two times a day  LORazepam   Injectable 1 milliGRAM(s) IV Push every 4 hours PRN  losartan 50 milliGRAM(s) Oral daily  metoprolol tartrate 50 milliGRAM(s) Oral two times a day  pantoprazole    Tablet 40 milliGRAM(s) Oral before breakfast      Allergies    No Known Allergies    Intolerances            Vital Signs Last 24 Hrs  T(C): 36.9 (26 Jul 2018 13:49), Max: 37.4 (25 Jul 2018 17:22)  T(F): 98.4 (26 Jul 2018 13:49), Max: 99.3 (25 Jul 2018 17:22)  HR: 93 (26 Jul 2018 14:31) (84 - 94)  BP: 177/81 (26 Jul 2018 14:31) (125/77 - 180/90)  BP(mean): --  RR: 20 (26 Jul 2018 13:49) (16 - 20)  SpO2: 98% (26 Jul 2018 13:49) (94% - 98%)    REVIEW OF SYSTEMS:     Constitutional: No fever, chills, fatigue, weakness  Eyes: no eye pain, visual disturbances, or discharge  ENT:  No difficulty hearing, tinnitus, vertigo; No sinus or throat pain  Neck: No pain or stiffness  Respiratory: No cough, dyspnea, wheezing   Cardiovascular: No chest pain, palpitations,   Gastrointestinal: No abdominal or epigastric pain. No nausea, vomiting  No diarrhea or constipation.   Genitourinary: No dysuria, frequency, hematuria or incontinence  Neurological: No headaches, lightheadedness, vertigo, numbness or tremors  Psychiatric: No depression, anxiety, mood swings or difficulty sleeping  Musculoskeletal: No joint pain or swelling; No muscle, back or extremity pain  Skin: No itching, burning, rashes or lesions   Lymph Nodes: No enlarged glands  Endocrine: No heat or cold intolerance; No hair loss   Allergy and Immunologic: No hives or eczema    On Neurological Examination::      The patient is awake, alert.      Location was hospital.  She was able to name simple objects.  The patient was forgetful, does not know why she came to the hospital.  Extraocular movements were intact.      Pupils were equal, round, and reactive bilaterally, 3 mm to 2.      Speech was fluent.      Tongue, no bite otilia.      Motor:  Bilateral upper and lower were 4/5.      positive hand tremors     Sensory:  Bilateral upper and lower were intact to light touch.  No drift.    Follow simple commands      GENERAL Exam: Nontoxic , No Acute Distress   	  HEENT:  normocephalic, atraumatic  		  LUNGS: Clear bilaterally    	  HEART: Normal S1S2   No murmur RRR        	  GI/ ABDOMEN:  Soft  Non tender    EXTREMITIES:   No Edema  No Clubbing  No Cyanosis No Edema    MUSCULOSKELETAL:  decreased Range of Motion all 4 extremities   	   SKIN: Normal  No Ecchymosis                    LABS:  CBC Full  -  ( 26 Jul 2018 06:49 )  WBC Count : 6.55 K/uL  Hemoglobin : 9.4 g/dL  Hematocrit : 29.6 %  Platelet Count - Automated : 322 K/uL  Mean Cell Volume : 81.5 fl  Mean Cell Hemoglobin : 25.9 pg  Mean Cell Hemoglobin Concentration : 31.8 gm/dL  Auto Neutrophil # : x  Auto Lymphocyte # : x  Auto Monocyte # : x  Auto Eosinophil # : x  Auto Basophil # : x  Auto Neutrophil % : x  Auto Lymphocyte % : x  Auto Monocyte % : x  Auto Eosinophil % : x  Auto Basophil % : x      07-26    140  |  108  |  11  ----------------------------<  85  4.0   |  22  |  0.84    Ca    9.3      26 Jul 2018 06:49      Hemoglobin A1C:       Vitamin B12         RADIOLOGY    ANALYSIS AND PLAN:  An 83-year-old with possible seizure event, history of Parkinson's and meningioma.  1.	For possible seizure event, we will increase the patient's Vimpat to 100 mg two times a day.  2.	I would recommend Ativan 1 mg IV push q.6h. p.r.n. for breakthrough seizures.  3.	Seizure precautions.  4.	For history of Parkinson's disease, continue the patient on her Sinemet.  5.	For history of meningioma, appears to be stable, unclear what previous sizes of meningiomas are.  6.	I would recommend aspiration precautions.  7.	Infectious Disease antibiotics as needed   8.	Fall precautions.  9.	spoke to spouse, Osmin at [phone], alternate number is [phone]. 7/26/18 at bedside patient appears back to baseline to follow up with outside neurologist   10.	no new events     Thank you for the courtesy of consultation.      Physical therapy evaluation as tolerated  OOB to chair/ambulation with assistance only if possible.    Greater than 35 minutes spent in direct patient care reviewing  the notes, lab data/ imaging , discussion with multidisciplinary team.
Patient is a 83y old  Female who presents with a chief complaint of pt passed out, possible seizure (23 Jul 2018 17:46)      INTERVAL HPI/OVERNIGHT EVENTS:no acute event   has no memory what happened yesterday and why she is here    Home Medications:  cefuroxime 250 mg oral tablet: 1 tab(s) orally every 12 hours (23 Jul 2018 17:20)  Eliquis 2.5 mg oral tablet: 1 tab(s) orally 2 times a day (23 Jul 2018 17:20)  Metoprolol Tartrate 50 mg oral tablet: 1 tab(s) orally 2 times a day (23 Jul 2018 17:20)  omeprazole 20 mg oral delayed release capsule: 1 cap(s) orally once a day (23 Jul 2018 17:20)  Sinemet 25 mg-100 mg oral tablet: 1 tab(s) orally 3 times a day (23 Jul 2018 17:20)  valsartan 80 mg oral tablet: 1 tab(s) orally once a day (23 Jul 2018 17:20)  Vimpat 50 mg oral tablet: 1 tab(s) orally 2 times a day (23 Jul 2018 17:20)      MEDICATIONS  (STANDING):  apixaban 2.5 milliGRAM(s) Oral every 12 hours  carbidopa/levodopa  25/100 1 Tablet(s) Oral three times a day  lacosamide 100 milliGRAM(s) Oral two times a day  losartan 50 milliGRAM(s) Oral daily  metoprolol tartrate 50 milliGRAM(s) Oral two times a day  pantoprazole    Tablet 40 milliGRAM(s) Oral before breakfast  piperacillin/tazobactam IVPB. 3.375 Gram(s) IV Intermittent every 8 hours    MEDICATIONS  (PRN):  LORazepam   Injectable 1 milliGRAM(s) IV Push every 4 hours PRN seizure      Allergies    No Known Allergies    Intolerances        REVIEW OF SYSTEMS:  CONSTITUTIONAL: No fever, weight loss, has  fatigue  EYES: No eye pain, visual disturbances, or discharge  ENMT:  No difficulty hearing, tinnitus, vertigo; No sinus or throat pain  NECK: No pain or stiffness  BREASTS: No pain, masses, or nipple discharge  RESPIRATORY: No cough, wheezing, chills or hemoptysis; No shortness of breath  CARDIOVASCULAR: No chest pain, palpitations, dizziness, or leg swelling  GASTROINTESTINAL: No abdominal or epigastric pain. No nausea, vomiting, or hematemesis; No diarrhea or constipation. No melena or hematochezia.  GENITOURINARY: No dysuria, frequency, hematuria, or incontinence  NEUROLOGICAL: No headaches,poor  memory ,has  loss of strengthno, numbness, or tremors  SKIN: No itching, burning, rashes, or lesions   ENDOCRINE: No heat or cold intolerance; No hair loss  MUSCULOSKELETAL: No joint pain or swelling; No muscle, back, or extremity pain  PSYCHIATRIC: No depression, anxiety, mood swings, or difficulty sleeping  HEME/LYMPH: No easy bruising, or bleeding gums  ALLERGY AND IMMUNOLOGIC: No hives or eczema    Vital Signs Last 24 Hrs  T(C): 37.1 (24 Jul 2018 05:26), Max: 37.2 (23 Jul 2018 20:35)  T(F): 98.8 (24 Jul 2018 05:26), Max: 98.9 (23 Jul 2018 20:35)  HR: 90 (24 Jul 2018 05:26) (72 - 90)  BP: 156/84 (24 Jul 2018 05:26) (120/66 - 167/78)  BP(mean): --  RR: 16 (24 Jul 2018 05:26) (16 - 22)  SpO2: 96% (24 Jul 2018 05:26) (95% - 100%)    PHYSICAL EXAM:  GENERAL:  well-groomed, well-developed  HEAD:  Atraumatic, Normocephalic  EYES: EOMI, PERRLA, conjunctiva and sclera clear  ENMT: Moist mucous membranes,   NECK: Supple, No JVD, Normal thyroid  NERVOUS SYSTEM:  Alert & Oriented X2,poor memory and poor concentration; Motor Strength 4/5 B/L upper and lower extremities; DTRs 2+ intact and symmetric  CHEST/LUNG: Clear to percussion bilaterally; No rales, rhonchi, wheezing, or rubs  HEART: Regular rate and rhythm; No murmurs, rubs, or gallops  ABDOMEN: Soft, Nontender, Nondistended; Bowel sounds present  EXTREMITIES:  2+ Peripheral Pulses, No clubbing, cyanosis, or edema  SKIN: No rashes or lesions    LABS:                        9.3    10.41 )-----------( 316      ( 24 Jul 2018 06:56 )             28.8     07-24    137  |  110<H>  |  13  ----------------------------<  94  3.8   |  23  |  0.72    Ca    9.0      24 Jul 2018 06:56    TPro  5.6<L>  /  Alb  2.7<L>  /  TBili  0.6  /  DBili  x   /  AST  14  /  ALT  <10<L>  /  AlkPhos  117  07-24    PT/INR - ( 23 Jul 2018 15:53 )   PT: 16.4 sec;   INR: 1.49 ratio         PTT - ( 23 Jul 2018 15:53 )  PTT:29.6 sec    CAPILLARY BLOOD GLUCOSE              I&O's Summary    24 Jul 2018 07:01  -  24 Jul 2018 09:06  --------------------------------------------------------  IN: 300 mL / OUT: 0 mL / NET: 300 mL        RADIOLOGY & ADDITIONAL TESTS:    Imaging Personally Reviewed:  [x ] YES  [ ] NO    Consultant(s) Notes Reviewed:  [ x] YES  [ ] NO    Care Discussed with Consultants/Other Providers [ x] YES  [ ] NO
Patient is a 83y old  Female who presents with a chief complaint of pt passed out, possible seizure (23 Jul 2018 17:46)      INTERVAL HPI/OVERNIGHT EVENTS:no acute event overnight    Home Medications:  cefuroxime 250 mg oral tablet: 1 tab(s) orally every 12 hours (23 Jul 2018 17:20)  Eliquis 2.5 mg oral tablet: 1 tab(s) orally 2 times a day (23 Jul 2018 17:20)  Metoprolol Tartrate 50 mg oral tablet: 1 tab(s) orally 2 times a day (23 Jul 2018 17:20)  omeprazole 20 mg oral delayed release capsule: 1 cap(s) orally once a day (23 Jul 2018 17:20)  Sinemet 25 mg-100 mg oral tablet: 1 tab(s) orally 3 times a day (23 Jul 2018 17:20)  valsartan 80 mg oral tablet: 1 tab(s) orally once a day (23 Jul 2018 17:20)  Vimpat 50 mg oral tablet: 1 tab(s) orally 2 times a day (23 Jul 2018 17:20)      MEDICATIONS  (STANDING):  apixaban 2.5 milliGRAM(s) Oral every 12 hours  carbidopa/levodopa  25/100 1 Tablet(s) Oral three times a day  lacosamide 100 milliGRAM(s) Oral two times a day  losartan 50 milliGRAM(s) Oral daily  metoprolol tartrate 50 milliGRAM(s) Oral two times a day  pantoprazole    Tablet 40 milliGRAM(s) Oral before breakfast  piperacillin/tazobactam IVPB. 3.375 Gram(s) IV Intermittent every 8 hours    MEDICATIONS  (PRN):  LORazepam   Injectable 1 milliGRAM(s) IV Push every 4 hours PRN seizure      Allergies    No Known Allergies    Intolerances        REVIEW OF SYSTEMS:  CONSTITUTIONAL: No fever, weight loss, or fatigue  EYES: No eye pain, visual disturbances, or discharge  ENMT:  No difficulty hearing, tinnitus, vertigo; No sinus or throat pain  NECK: No pain or stiffness  BREASTS: No pain, masses, or nipple discharge  RESPIRATORY: No cough, wheezing, chills or hemoptysis; No shortness of breath  CARDIOVASCULAR: No chest pain, palpitations, dizziness, or leg swelling  GASTROINTESTINAL: No abdominal or epigastric pain. No nausea, vomiting, or hematemesis; No diarrhea or constipation. No melena or hematochezia.  GENITOURINARY: No dysuria, frequency, hematuria, or incontinence  NEUROLOGICAL: No headaches,has memory loss,and loss of strength,no  numbness, or tremors  SKIN: No itching, burning, rashes, or lesions   LYMPH NODES: No enlarged glands  ENDOCRINE: No heat or cold intolerance; No hair loss  MUSCULOSKELETAL: No joint pain or swelling; No muscle, back, or extremity pain  PSYCHIATRIC: No depression, anxiety, mood swings, or difficulty sleeping  HEME/LYMPH: No easy bruising, or bleeding gums  ALLERGY AND IMMUNOLOGIC: No hives or eczema    Vital Signs Last 24 Hrs  T(C): 36.4 (25 Jul 2018 09:36), Max: 37.3 (24 Jul 2018 21:39)  T(F): 97.6 (25 Jul 2018 09:36), Max: 99.2 (24 Jul 2018 21:39)  HR: 70 (25 Jul 2018 09:36) (70 - 92)  BP: 136/74 (25 Jul 2018 09:36) (119/70 - 167/89)  BP(mean): --  RR: 18 (25 Jul 2018 09:36) (16 - 18)  SpO2: 96% (25 Jul 2018 09:36) (92% - 98%)    PHYSICAL EXAM:  GENERAL: NAD, well-groomed, well-developed  HEAD:  Atraumatic, Normocephalic  EYES: EOMI, PERRLA, conjunctiva and sclera clear  ENMT: Moist mucous membranes,   NECK: Supple, No JVD, Normal thyroid  NERVOUS SYSTEM:  Alert & Oriented X1, confused poor memory and poor  concentration; Motor Strength 4/5 B/L upper and lower extremities; DTRs 2+ intact and symmetric  CHEST/LUNG: Clear to percussion bilaterally; No rales, rhonchi, wheezing, or rubs  HEART: Regular rate and rhythm; No murmurs, rubs, or gallops  ABDOMEN: Soft, Nontender, Nondistended; Bowel sounds present  EXTREMITIES:  2+ Peripheral Pulses, No clubbing, cyanosis, or edema  SKIN: No rashes or lesions    LABS:                        9.5    8.65  )-----------( 291      ( 25 Jul 2018 08:04 )             30.4     07-25    138  |  108  |  9   ----------------------------<  78  4.2   |  25  |  0.78    Ca    8.9      25 Jul 2018 08:04    TPro  5.6<L>  /  Alb  2.7<L>  /  TBili  0.6  /  DBili  x   /  AST  14  /  ALT  <10<L>  /  AlkPhos  117  07-24    PT/INR - ( 23 Jul 2018 15:53 )   PT: 16.4 sec;   INR: 1.49 ratio         PTT - ( 23 Jul 2018 15:53 )  PTT:29.6 sec    CAPILLARY BLOOD GLUCOSE            Culture - Blood (collected 07-23-18 @ 22:17)  Source: .Blood Blood  Preliminary Report (07-24-18 @ 23:01):    No growth to date.    Culture - Blood (collected 07-23-18 @ 22:17)  Source: .Blood Blood  Preliminary Report (07-24-18 @ 23:01):    No growth to date.        I&O's Summary    24 Jul 2018 07:01  -  25 Jul 2018 07:00  --------------------------------------------------------  IN: 400 mL / OUT: 0 mL / NET: 400 mL        RADIOLOGY & ADDITIONAL TESTS:    Imaging Personally Reviewed:  [x ] YES  [ ] NO    Consultant(s) Notes Reviewed:  [x ] YES  [ ] NO    Care Discussed with Consultants/Other Providers [x ] YES  [ ] NO
Patient is a 83y old  Female who presents with a chief complaint of pt passed out, possible seizure (26 Jul 2018 10:01)      INTERVAL HPI/OVERNIGHT EVENTS:no acute event overnight    Home Medications:  cefuroxime 250 mg oral tablet: 1 tab(s) orally every 12 hours (23 Jul 2018 17:20)  Eliquis 2.5 mg oral tablet: 1 tab(s) orally 2 times a day (23 Jul 2018 17:20)  Metoprolol Tartrate 50 mg oral tablet: 1 tab(s) orally 2 times a day (23 Jul 2018 17:20)  omeprazole 20 mg oral delayed release capsule: 1 cap(s) orally once a day (23 Jul 2018 17:20)  Sinemet 25 mg-100 mg oral tablet: 1 tab(s) orally 3 times a day (23 Jul 2018 17:20)  valsartan 80 mg oral tablet: 1 tab(s) orally once a day (23 Jul 2018 17:20)  Vimpat 50 mg oral tablet: 1 tab(s) orally 2 times a day (23 Jul 2018 17:20)      MEDICATIONS  (STANDING):  amoxicillin  500 milliGRAM(s)/clavulanate 1 Tablet(s) Oral every 12 hours  apixaban 2.5 milliGRAM(s) Oral every 12 hours  carbidopa/levodopa  25/100 1 Tablet(s) Oral three times a day  lacosamide 100 milliGRAM(s) Oral two times a day  losartan 50 milliGRAM(s) Oral daily  metoprolol tartrate 50 milliGRAM(s) Oral two times a day  pantoprazole    Tablet 40 milliGRAM(s) Oral before breakfast    MEDICATIONS  (PRN):  LORazepam   Injectable 1 milliGRAM(s) IV Push every 4 hours PRN seizure      Allergies    No Known Allergies    Intolerances        REVIEW OF SYSTEMS:  CONSTITUTIONAL: No fever, weight loss, or fatigue  EYES: No eye pain, visual disturbances, or discharge  ENMT:  No difficulty hearing, tinnitus, vertigo; No sinus or throat pain  NECK: No pain or stiffness  BREASTS: No pain, masses, or nipple discharge  RESPIRATORY: No cough, wheezing, chills or hemoptysis; No shortness of breath  CARDIOVASCULAR: No chest pain, palpitations, dizziness, or leg swelling  GASTROINTESTINAL: No abdominal or epigastric pain. No nausea, vomiting, or hematemesis; No diarrhea or constipation. No melena or hematochezia.  GENITOURINARY: No dysuria, frequency, hematuria, or incontinence  NEUROLOGICAL: No headaches,has  memory loss,mild loss of strength,no  numbness, or tremors  SKIN: No itching, burning, rashes, or lesions  ENDOCRINE: No heat or cold intolerance; No hair loss  MUSCULOSKELETAL: No joint pain or swelling; No muscle, back, or extremity pain  PSYCHIATRIC: No depression, anxiety, mood swings, or difficulty sleeping  HEME/LYMPH: No easy bruising, or bleeding gums  ALLERGY AND IMMUNOLOGIC: No hives or eczema    Vital Signs Last 24 Hrs  T(C): 37 (26 Jul 2018 09:45), Max: 37.4 (25 Jul 2018 17:22)  T(F): 98.6 (26 Jul 2018 09:45), Max: 99.3 (25 Jul 2018 17:22)  HR: 84 (26 Jul 2018 09:45) (84 - 94)  BP: 146/78 (26 Jul 2018 09:45) (125/77 - 160/76)  BP(mean): --  RR: 16 (26 Jul 2018 09:45) (16 - 18)  SpO2: 94% (26 Jul 2018 09:45) (94% - 98%)    PHYSICAL EXAM:  GENERAL: NAD, well-groomed, well-developed  HEAD:  Atraumatic, Normocephalic  EYES: EOMI, PERRLA, conjunctiva and sclera clear  ENMT: Moist mucous membranes,   NECK: Supple, No JVD, Normal thyroid  NERVOUS SYSTEM:  Alert & Oriented X2, poor memory and poor concentration; Motor Strength 4/5 B/L upper and lower extremities; DTRs 2+ intact and symmetric  CHEST/LUNG: Clear to percussion bilaterally; No rales, rhonchi, wheezing, or rubs  HEART: Regular rate and rhythm; No murmurs, rubs, or gallops  ABDOMEN: Soft, Nontender, Nondistended; Bowel sounds present  EXTREMITIES:  2+ Peripheral Pulses, No clubbing, cyanosis, or edema  LYMPH: No lymphadenopathy noted  SKIN: No rashes or lesions    LABS:                        9.4    6.55  )-----------( 322      ( 26 Jul 2018 06:49 )             29.6     07-26    140  |  108  |  11  ----------------------------<  85  4.0   |  22  |  0.84    Ca    9.3      26 Jul 2018 06:49          CAPILLARY BLOOD GLUCOSE            Culture - Blood (collected 07-23-18 @ 22:17)  Source: .Blood Blood  Preliminary Report (07-24-18 @ 23:01):    No growth to date.    Culture - Blood (collected 07-23-18 @ 22:17)  Source: .Blood Blood  Preliminary Report (07-24-18 @ 23:01):    No growth to date.        I&O's Summary    25 Jul 2018 07:01  -  26 Jul 2018 07:00  --------------------------------------------------------  IN: 740 mL / OUT: 2 mL / NET: 738 mL    26 Jul 2018 07:01  -  26 Jul 2018 10:16  --------------------------------------------------------  IN: 240 mL / OUT: 0 mL / NET: 240 mL  < from: CT Head No Cont (07.23.18 @ 20:22) >  No acute intracranial hemorrhage.    Left-sided sphenoid wing meningioma.    Moderate severity microvascular disease.    < end of copied text >      < from: Xray Chest 1 View- PORTABLE-Urgent (07.23.18 @ 16:03) >    Small focus of airspace disease, left paracardiac region. No effusion. No   vascular congestion. Heart size appears enlarged although magnified   secondary to portable technique. Aorta shows atherosclerotic changes. Old   right rib fracture present. Old left rib fractures present. Multiple   kyphoplasties present lower dorsal spine. Mild scoliosis present   dorsal-lumbar spine.  Soft tissue calcifications present overlying the distal end of the left   clavicle.      < end of copied text >    RADIOLOGY & ADDITIONAL TESTS:    Imaging Personally Reviewed:  [ x] YES  [ ] NO    Consultant(s) Notes Reviewed:  [x ] YES  [ ] NO    Care Discussed with Consultants/Other Providers [x ] YES  [ ] NO
 used